# Patient Record
Sex: FEMALE | Race: WHITE | NOT HISPANIC OR LATINO | Employment: FULL TIME | URBAN - METROPOLITAN AREA
[De-identification: names, ages, dates, MRNs, and addresses within clinical notes are randomized per-mention and may not be internally consistent; named-entity substitution may affect disease eponyms.]

---
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
## 2021-10-26 ENCOUNTER — TRANSFERRED RECORDS (OUTPATIENT)
Dept: HEALTH INFORMATION MANAGEMENT | Facility: CLINIC | Age: 21
End: 2021-10-26

## 2021-11-03 ENCOUNTER — TRANSFERRED RECORDS (OUTPATIENT)
Dept: HEALTH INFORMATION MANAGEMENT | Facility: CLINIC | Age: 21
End: 2021-11-03

## 2022-11-17 ENCOUNTER — TRANSFERRED RECORDS (OUTPATIENT)
Dept: HEALTH INFORMATION MANAGEMENT | Facility: CLINIC | Age: 22
End: 2022-11-17

## 2023-01-11 ENCOUNTER — TRANSFERRED RECORDS (OUTPATIENT)
Dept: HEALTH INFORMATION MANAGEMENT | Facility: CLINIC | Age: 23
End: 2023-01-11

## 2023-05-17 ENCOUNTER — TRANSFERRED RECORDS (OUTPATIENT)
Dept: HEALTH INFORMATION MANAGEMENT | Facility: CLINIC | Age: 23
End: 2023-05-17

## 2023-11-20 NOTE — PROGRESS NOTES
"  Assessment & Plan     Bria was seen today for establish care and referral.    Diagnoses and all orders for this visit:    Encounter to establish care    Angular cheilitis  -     Adult Dermatology  Referral; Future    Prognathism  -     Oral Surgery Referral; Future    TERESITA (generalized anxiety disorder)  -     Adult Mental Health  Referral; Future  -     Adult Mental Health  Referral; Future    Congenital anteversion of both femurs         Venus Aleman DO PGY2  [facility] precepted with attending physician, Dr. Brewster.       Priti Fraser is a 23 year old, presenting for the following health issues:  Establish Care and Referral (Dermatology and psych)        11/21/2023     7:57 AM   Additional Questions   Roomed by camejo   Accompanied by self       HPI     23 year old female who presents today to establish care. Patient relocated to [place] from [place] 5 months ago. She was previously a patient of [facility].     Derm- she reports dry skin at the corners of her mouth and nose, has always been there but worse since moving to [place] and worse in winter. She was seen by dermatology in [place] and prescribed Hydrocortisone 0.025% and OTC \"jock itch cream,\" she uses these intermittently and applies them with a layer of vaseline. She has found this treatment to be somewhat helpful.     Psych- she was diagnosed with TERESITA as a young child, she reports her symptoms are well controlled today. Anxiety symptoms are more predominant than depression. She reports she was followed by psychiatry in [place] and prescribed 15 mg of lexapro daily, with 20 mg daily on the week of her menstruation. PHQ-9 score =8, TERESITA-7 score =11 (see objective for details). She states her psychiatrist diagnosed her clinically with ADHD, no formal neuropsych testing. She is prescribed 25 mg strattera that she does not take daily that she has been rationing.    Congenital bilateral femoral " "anteversion- patient was born with this condition and had braces as a child, no longer an issue, not followed by any specialists previously. She reports she was also previously seen by rheumatology for RA work up and was diagnosed with hypermobility syndrome. She does occasionally have hand pain that she believes is related to this condition.      Prognathism- she had braces as a child and her jaw was symmetrical, however- as an adult her jaw grew longer on one side and she was diagnosed with prognathism. She was evaluated by oral maxillary surgery team in [place] and was told she was a good surgical candidate. She endorses pain when chewing certain foods, talking for extended periods of time, or chewing gum. She would like to have the surgery eventually.           Objective    /74 (BP Location: Left arm, Patient Position: Sitting, Cuff Size: Adult Regular)   Pulse 71   Temp 97.9  F (36.6  C) (Tympanic)   Ht 1.717 m (5' 7.6\")   Wt 73.8 kg (162 lb 9.6 oz)   LMP 11/13/2023 (Exact Date)   SpO2 98%   BMI 25.02 kg/m    Body mass index is 25.02 kg/m .  Physical Exam   GENERAL: healthy, alert and no distress  EYES: Eyes grossly normal to inspection, conjunctivae and sclerae normal  NECK: no adenopathy, no asymmetry, masses, or scars   RESP: lungs clear to auscultation - no rales, rhonchi or wheezes  CV: regular rate and rhythm, normal S1 S2, no murmur, click or rub  MS: no gross musculoskeletal defects noted, no edema  SKIN: dry. Cracked skin at corners of her mouth and nose. No bleeding or open lesions.   NEURO: Normal strength and tone, mentation intact and speech normal        PATIENT HEALTH QUESTIONNAIRE-9 (PHQ - 9)    Over the last 2 weeks, how often have you been bothered by any of the following problems?    1. Little interest or pleasure in doing things -  Several days   2. Feeling down, depressed, or hopeless -  Several days   3. Trouble falling or staying asleep, or sleeping too much - Several days   4. " Feeling tired or having little energy -  Several days   5. Poor appetite or overeating -  Several days   6. Feeling bad about yourself - or that you are a failure or have let yourself or your family down -  Several days   7. Trouble concentrating on things, such as reading the newspaper or watching television - More than half the days   8. Moving or speaking so slowly that other people could have noticed? Or the opposite - being so fidgety or restless that you have been moving around a lot more than usual Not at all   9. Thoughts that you would be better off dead or of hurting  yourself in some way Not at all   Total Score: 8     If you checked off any problems, how difficult have these problems made it for you to do your work, take care of things at home, or get along with other people?      Developed by Tevin Peraza, Una Pelayo, Marck Etienne and colleagues, with an educational paco from [employer] No permission required to reproduce, translate, display or distribute. permission required to reproduce, translate, display or distribute.           11/21/2023     8:32 AM   TERESITA-7 SCORE   Total Score 11       ----- Service Performed and Documented by Resident or Fellow ------

## 2023-11-21 ENCOUNTER — OFFICE VISIT (OUTPATIENT)
Dept: FAMILY MEDICINE | Facility: CLINIC | Age: 23
End: 2023-11-21
Payer: COMMERCIAL

## 2023-11-21 VITALS
WEIGHT: 162.6 LBS | OXYGEN SATURATION: 98 % | SYSTOLIC BLOOD PRESSURE: 113 MMHG | TEMPERATURE: 97.9 F | HEIGHT: 68 IN | BODY MASS INDEX: 24.64 KG/M2 | DIASTOLIC BLOOD PRESSURE: 74 MMHG | HEART RATE: 71 BPM

## 2023-11-21 DIAGNOSIS — M26.19 PROGNATHISM: ICD-10-CM

## 2023-11-21 DIAGNOSIS — K13.0 ANGULAR CHEILITIS: ICD-10-CM

## 2023-11-21 DIAGNOSIS — Q65.89 CONGENITAL ANTEVERSION OF BOTH FEMURS: ICD-10-CM

## 2023-11-21 DIAGNOSIS — Z76.89 ENCOUNTER TO ESTABLISH CARE: Primary | ICD-10-CM

## 2023-11-21 DIAGNOSIS — F41.1 GAD (GENERALIZED ANXIETY DISORDER): ICD-10-CM

## 2023-11-21 PROCEDURE — 99203 OFFICE O/P NEW LOW 30 MIN: CPT | Mod: GC

## 2023-11-21 RX ORDER — ESCITALOPRAM OXALATE 10 MG/1
15 TABLET ORAL DAILY
COMMUNITY
Start: 2020-01-15

## 2023-11-21 RX ORDER — ATOMOXETINE 25 MG/1
CAPSULE ORAL
COMMUNITY
Start: 2023-05-29 | End: 2024-02-05 | Stop reason: ALTCHOICE

## 2023-11-21 ASSESSMENT — ANXIETY QUESTIONNAIRES
GAD7 TOTAL SCORE: 11
6. BECOMING EASILY ANNOYED OR IRRITABLE: MORE THAN HALF THE DAYS
3. WORRYING TOO MUCH ABOUT DIFFERENT THINGS: MORE THAN HALF THE DAYS
1. FEELING NERVOUS, ANXIOUS, OR ON EDGE: MORE THAN HALF THE DAYS
GAD7 TOTAL SCORE: 11
7. FEELING AFRAID AS IF SOMETHING AWFUL MIGHT HAPPEN: SEVERAL DAYS
IF YOU CHECKED OFF ANY PROBLEMS ON THIS QUESTIONNAIRE, HOW DIFFICULT HAVE THESE PROBLEMS MADE IT FOR YOU TO DO YOUR WORK, TAKE CARE OF THINGS AT HOME, OR GET ALONG WITH OTHER PEOPLE: SOMEWHAT DIFFICULT
2. NOT BEING ABLE TO STOP OR CONTROL WORRYING: MORE THAN HALF THE DAYS
5. BEING SO RESTLESS THAT IT IS HARD TO SIT STILL: SEVERAL DAYS

## 2023-11-21 ASSESSMENT — PATIENT HEALTH QUESTIONNAIRE - PHQ9
5. POOR APPETITE OR OVEREATING: SEVERAL DAYS
SUM OF ALL RESPONSES TO PHQ QUESTIONS 1-9: 8

## 2023-11-21 NOTE — ASSESSMENT & PLAN NOTE
TERESITA-7 score = 11 on 11/21. Current treatment: 15 mg lexapro daily and 20 mg daily the week of her menstruation. Referral for counseling placed 11/21. Community Resources provided in S.

## 2023-11-21 NOTE — PATIENT INSTRUCTIONS
Your doctor has put in a mental health referral for psychotherapy and/or psychiatry treatment. Our behavioral health team will evaluate your referral to see if they have any more recommendations or if they have openings for therapy in-house at [facility]. Our referral team would then contact you within 1-2 weeks with this update. However, if you want to get started for psychotherapy or schedule with a psychiatrist sooner, we recommend you call one or two of the following resources.  Let your doctor know if you do connect with a therapist or psychiatrist.     [facility] Mental [facility] and Addiction Central Scheduling  [phone].     Community Mental Health Agencies:    [facility] Office   [address]  [place] [address]  [phone] (for appointments)    [facility]  [address] [address]  Phone:  [phone]    [address]  [place] [address]  [phone]    [facility] [employer]  [address] [place] [address]  [phone]    [facility]  [address] [facility] [address]  [address]  [place], [place] [address]  [phone]    [facility]  [address]  [place] [address]  [phone]    Community Psychiatry Agencies:    [facility]  [address] [facility] [address] [facility] [address]  [address]  [place], [place] [address]  [phone]    [facility] Office  [address]  [place] [address]  [phone] (for appointments)    [facility]  [address]  [place] [address]  Phone:  [phone]    [address]  [address]  [place], [place] [address] [phone]    [facility] [employer]  [address] [facility]  [address]  [place] [address]  [phone]    [facility]  [address]  [place] [address]  [phone]    [facility] - offers psychiatry and psychology services across the lifespan  [facility]  [address] [place] [address] [address]  [place], [place]  [phone]    Children s Mental Health Agencies:    [facility] [address] [place] [place] [address]  [phone]    Walk-in evaluations are available in-person (walk into clinic) or virtually (call us) Monday through Thursday from 9 a.m. - 3 p.m. and Friday from 9 a.m. - 2 p.m. with no appointment needed for adults and youth (ages 6-17) with a parent or guardian.    Adrian Romero  [address] [place] [address]  [phone]    [facility] Office  [address] [place] [id number] [phone]    [facility] Office  [facility] [place] [address]  Phone: [phone]    Associated [address] [address]  [phone] (for appointments)    [facility] [address] [facility]  [address]  [place] [address]  Phone:  [phone]      Teen Mental Health Agencies:    Face To Face  [address] [place] [address]  Call [phone] to schedule an appointment.    [address] [address]  [phone]    Walk-in evaluations are available in-person (walk into clinic) or virtually (call us) Monday through Thursday from 9 a.m. - 3 p.m. and Friday from 9 a.m. - 2 p.m. with no appointment needed for adults and youth (ages 6-17) with a parent or guardian.    Adrian Romero  [address] [place] [address]  [phone]    [facility] [address] [address]  [phone] (for appointments)  Fax: [phone]    [facility]  [address] [facility] [facility]  [address]  [place] [address]  Phone:  [phone]

## 2023-11-21 NOTE — PROGRESS NOTES
Preceptor Attestation:    I discussed the patient with the resident and evaluated the patient in person. I have verified the content of the note, which accurately reflects my assessment of the patient and the plan of care.   Supervising Physician:  Chapincito Brewster MD.

## 2023-12-31 ENCOUNTER — HEALTH MAINTENANCE LETTER (OUTPATIENT)
Age: 23
End: 2023-12-31

## 2024-01-29 ASSESSMENT — ENCOUNTER SYMPTOMS
PARESTHESIAS: 0
ABDOMINAL PAIN: 0
HEMATURIA: 0
COUGH: 0
WEAKNESS: 0
DIARRHEA: 0
MYALGIAS: 0
SHORTNESS OF BREATH: 0
NERVOUS/ANXIOUS: 0
DIZZINESS: 0
NAUSEA: 0
FEVER: 0
CONSTIPATION: 0
PALPITATIONS: 0
ARTHRALGIAS: 0
DYSURIA: 0
HEARTBURN: 0
HEADACHES: 0
HEMATOCHEZIA: 0
JOINT SWELLING: 0
CHILLS: 0
FREQUENCY: 0
SORE THROAT: 0
BREAST MASS: 0
EYE PAIN: 0

## 2024-02-05 ENCOUNTER — OFFICE VISIT (OUTPATIENT)
Dept: FAMILY MEDICINE | Facility: CLINIC | Age: 24
End: 2024-02-05
Payer: COMMERCIAL

## 2024-02-05 VITALS
OXYGEN SATURATION: 96 % | BODY MASS INDEX: 24.89 KG/M2 | TEMPERATURE: 98.1 F | HEIGHT: 67 IN | RESPIRATION RATE: 20 BRPM | DIASTOLIC BLOOD PRESSURE: 66 MMHG | HEART RATE: 89 BPM | SYSTOLIC BLOOD PRESSURE: 107 MMHG | WEIGHT: 158.6 LBS

## 2024-02-05 DIAGNOSIS — Z11.4 SCREENING FOR HIV (HUMAN IMMUNODEFICIENCY VIRUS): ICD-10-CM

## 2024-02-05 DIAGNOSIS — Z11.59 NEED FOR HEPATITIS C SCREENING TEST: ICD-10-CM

## 2024-02-05 DIAGNOSIS — Z00.00 PREVENTATIVE HEALTH CARE: Primary | ICD-10-CM

## 2024-02-05 LAB
ERYTHROCYTE [DISTWIDTH] IN BLOOD BY AUTOMATED COUNT: 12.2 % (ref 10–15)
HCT VFR BLD AUTO: 41 % (ref 35–47)
HGB BLD-MCNC: 13.8 G/DL (ref 11.7–15.7)
MCH RBC QN AUTO: 27.8 PG (ref 26.5–33)
MCHC RBC AUTO-ENTMCNC: 33.7 G/DL (ref 31.5–36.5)
MCV RBC AUTO: 83 FL (ref 78–100)
PLATELET # BLD AUTO: 371 10E3/UL (ref 150–450)
RBC # BLD AUTO: 4.96 10E6/UL (ref 3.8–5.2)
WBC # BLD AUTO: 8.7 10E3/UL (ref 4–11)

## 2024-02-05 PROCEDURE — 87389 HIV-1 AG W/HIV-1&-2 AB AG IA: CPT

## 2024-02-05 PROCEDURE — 36415 COLL VENOUS BLD VENIPUNCTURE: CPT

## 2024-02-05 PROCEDURE — 85027 COMPLETE CBC AUTOMATED: CPT

## 2024-02-05 PROCEDURE — 82306 VITAMIN D 25 HYDROXY: CPT

## 2024-02-05 PROCEDURE — 84443 ASSAY THYROID STIM HORMONE: CPT

## 2024-02-05 PROCEDURE — 82533 TOTAL CORTISOL: CPT

## 2024-02-05 PROCEDURE — 91320 SARSCV2 VAC 30MCG TRS-SUC IM: CPT

## 2024-02-05 PROCEDURE — 99395 PREV VISIT EST AGE 18-39: CPT | Mod: 25

## 2024-02-05 PROCEDURE — 86803 HEPATITIS C AB TEST: CPT

## 2024-02-05 PROCEDURE — 90480 ADMN SARSCOV2 VAC 1/ONLY CMP: CPT

## 2024-02-05 RX ORDER — NAPROXEN 500 MG/1
500 TABLET ORAL 2 TIMES DAILY WITH MEALS
Qty: 60 TABLET | Refills: 0 | Status: SHIPPED | OUTPATIENT
Start: 2024-02-05 | End: 2024-03-06

## 2024-02-05 RX ORDER — DEXTROAMPHETAMINE SACCHARATE, AMPHETAMINE ASPARTATE MONOHYDRATE, DEXTROAMPHETAMINE SULFATE AND AMPHETAMINE SULFATE 2.5; 2.5; 2.5; 2.5 MG/1; MG/1; MG/1; MG/1
10 CAPSULE, EXTENDED RELEASE ORAL DAILY
COMMUNITY

## 2024-02-05 NOTE — PROGRESS NOTES
Preventive Care Visit  [facility]  Venus Aleman DO, Student in organized health care education/training program  Feb 5, 2024    Assessment & Plan     Preventative health care  23 year old female with history of TERESITA, ADHD, angular cheilitis, prognathism, and hypermobility syndrome in her usual state of health. She is due for cervical cancer screening but prefers to have this done by OB who she will see next month. Not currently due for any further preventative screening. Reviewed medical, family, and social history today. Will collect labs for vitamin D deficiency, CBC, TSH, and cortisol. Follows with psych for ADHD and TERESITA, establishing with derm and OB soon.   - amphetamine-dextroamphetamine (ADDERALL XR) 10 MG 24 hr capsule  - COVID-19 12+ (2023-24) (PFIZER)  - naproxen (NAPROSYN) 500 MG tablet  Dispense: 60 tablet; Refill: 0  - Vitamin D deficiency screening  - CBC with Platelets and Reflex to Iron Studies  - TSH with free T4 reflex  - Cortisol    Screening for HIV (human immunodeficiency virus)  Need for hepatitis C screening test  Patient agreeable to one time recommended screening.   - Hepatitis C Screen Reflex to HCV RNA Quant and Genotype  - HIV Screening        Counseling  Appropriate preventive services were discussed with this patient, including applicable screening as appropriate for fall prevention, nutrition, physical activity, Tobacco-use cessation, weight loss and cognition.  Checklist reviewing preventive services available has been given to the patient.  Reviewed patient's diet, addressing concerns and/or questions.     Patient has been advised of split billing requirements and indicates understanding: Yes      Return in about 1 year (around 2/5/2025) for Preventive Visit.    Priti Fraser is a 23 year old, presenting for the following:  Physical and LAB REQUEST        2/5/2024     4:07 PM   Additional Questions   Roomed by    Accompanied by Self           Healthy Habits:     Getting at least 3 servings of Calcium per day:  Yes    Bi-annual eye exam:  NO    Dental care twice a year:  Yes    Sleep apnea or symptoms of sleep apnea:  None    Diet:  Regular (no restrictions) and Breakfast skipped    Frequency of exercise:  2-3 days/week    Duration of exercise:  15-30 minutes    Taking medications regularly:  Yes    Medication side effects:  None    Additional concerns today:  Yes      Patient is in her usual state of health today and feeling well. She follows with psych for MDD and ADHD, she is waiting to establish with derm with a scheduled appt in 07/2024 for her angular cheilitis, and she has yet to hear back from oral surgery who she reached out to regarding her referral.     She is due for a pap but plans to have this done with ob/gyn in a few weeks. She is still sexually active with one male partner in a monogamous relationship with no  complaints. She recently discontinued her OCPs due to weight gain and is using barrier method of protection. She reports her menstruation is regular in frequency and blood loss, her LMP was 1/19/24. She endorses menstrual cramps with her cycles.     Her legs have on several occasions become cold and itchy while walking outside, though not in particularly cold weather with some of the occasions being in [place]. These episodes self resolve.         1/29/2024   Social Factors   Worry food won't last until get money to buy more No   Food not last or not have enough money for food? No   Do you have housing?  Yes   Are you worried about losing your housing? No   Lack of transportation? No   Unable to get utilities (heat,electricity)? No         Today's PHQ-2 Score:       11/21/2023     8:32 AM   PHQ-2 ( 1999 [employer])   Q1: Little interest or pleasure in doing things 1   Q2: Feeling down, depressed or hopeless 1   PHQ-2 Score 2         1/29/2024   Substance Use   Alcohol more than 3/day or more than 7/wk No     Social History  "    Tobacco Use    Smoking status: Never    Smokeless tobacco: Never         History of abnormal Pap smear: NO - age 21-29 PAP every 3 years recommended                  Reviewed and updated as needed this visit by Provider                    No past medical history on file.  No past surgical history on file.  Lab work is in process       Objective    Exam  /66 (BP Location: Right arm, Patient Position: Sitting, Cuff Size: Adult Regular)   Pulse 89   Temp 98.1  F (36.7  C) (Oral)   Resp 20   Ht 1.709 m (5' 7.3\")   Wt 71.9 kg (158 lb 9.6 oz)   LMP 01/19/2024 (Exact Date)   SpO2 96%   BMI 24.62 kg/m     Estimated body mass index is 24.62 kg/m  as calculated from the following:    Height as of this encounter: 1.709 m (5' 7.3\").    Weight as of this encounter: 71.9 kg (158 lb 9.6 oz).    Physical Exam  GENERAL: alert and no distress  EYES: Eyes grossly normal to inspection, conjunctivae and sclerae normal  NECK: no adenopathy, no asymmetry, masses, or scars  RESP: lungs clear to auscultation - no rales, rhonchi or wheezes  CV: regular rate and rhythm, normal S1 S2, no S3 or S4, no murmur, click or rub, no peripheral edema  ABDOMEN: soft, nontender, no hepatosplenomegaly, no masses and bowel sounds normal  MS: no gross musculoskeletal defects noted, no edema  PSYCH: mentation appears normal, affect normal/bright      Signed Electronically by: Venus Aleman DO    Answers submitted by the patient for this visit:  Annual Preventive Visit (Submitted on 1/29/2024)  Chief Complaint: Annual Exam:  Blood in stool: No  heartburn: No  peripheral edema: No  mood changes: No  Skin sensation changes: No  tenderness: No  breast mass: No  breast discharge: No    "

## 2024-02-05 NOTE — PATIENT INSTRUCTIONS
"Please be aware that coverage of these services is subject to the terms and limitations of your health insurance plan.  Call member services at your health plan with any benefit or coverage questions.  [facility] - [place] - [phone]  ----------------------------------------------------------    Eating Healthy Foods: Care Instructions  With every meal, you can make healthy food choices. Try to eat a variety of fruits, vegetables, whole grains, lean proteins, and low-fat dairy products. This can help you get the right balance of nutrients, including vitamins and minerals. Small changes add up over time. You can start by adding one healthy food to your meals each day.    Try to make half your plate fruits and vegetables, one-fourth whole grains, and one-fourth lean proteins. Try including dairy with your meals.   Eat more fruits and vegetables. Try to have them with most meals and snacks.   Foods for healthy eating    Fruits    These can be fresh, frozen, canned, or dried.  Try to choose whole fruit rather than fruit juice.  Eat a variety of colors.    Vegetables    These can be fresh, frozen, canned, or dried.  Beans, peas, and lentils count too.    Whole grains    Choose whole-grain breads, cereals, and noodles.  Try brown rice.    Lean proteins    These can include lean meat, poultry, fish, and eggs.  You can also have tofu, beans, peas, lentils, nuts, and seeds.    Dairy    Try milk, yogurt, and cheese.  Choose low-fat or fat-free when you can.  If you need to, use lactose-free milk or fortified plant-based milk products, such as soy milk.    Water    Drink water when you're thirsty.  Limit sugar-sweetened drinks, including soda, fruit drinks, and sports drinks.  Where can you learn more?  Go to https://www.healthwise.net/patiented  Enter T756 in the search box to learn more about \"Eating Healthy Foods: Care Instructions.\"  Current as of: February 28, 2023               Content " Version: 13.8    4457-9177 [employer].   Care instructions adapted under license by your healthcare professional. If you have questions about a medical condition or this instruction, always ask your healthcare professional. [employer] disclaims any warranty or liability for your use of this information.      Preventive Care Advice   This is general advice given by our system to help you stay healthy. However, your care team may have specific advice just for you. Please talk to your care team about your preventive care needs.  Nutrition  Eat 5 or more servings of fruits and vegetables each day.  Try wheat bread, brown rice and whole grain pasta (instead of white bread, rice, and pasta).  Get enough calcium and vitamin D. Check the label on foods and aim for 100% of the RDA (recommended daily allowance).  Lifestyle  Exercise at least 150 minutes each week  (30 minutes a day, 5 days a week).  Do muscle strengthening activities 2 days a week. These help control your weight and prevent disease.  No smoking.  Wear sunscreen to prevent skin cancer.  Have a dental exam and cleaning every 6 months.  Yearly exams  See your health care team every year to talk about:  Any changes in your health.  Any medicines your care team has prescribed.  Preventive care, family planning, and ways to prevent chronic diseases.  Shots (vaccines)   HPV shots (up to age 26), if you've never had them before.  Hepatitis B shots (up to age 59), if you've never had them before.  COVID-19 shot: Get this shot when it's due.  Flu shot: Get a flu shot every year.  Tetanus shot: Get a tetanus shot every 10 years.  Pneumococcal, hepatitis A, and RSV shots: Ask your care team if you need these based on your risk.  Shingles shot (for age 50 and up)  General health tests  Diabetes screening:  Starting at age 35, Get screened for diabetes at least every 3 years.  If you are younger than age 35, ask your care team if you should be  screened for diabetes.  Cholesterol test: At age 39, start having a cholesterol test every 5 years, or more often if advised.  Bone density scan (DEXA): At age 50, ask your care team if you should have this scan for osteoporosis (brittle bones).  Hepatitis C: Get tested at least once in your life.  STIs (sexually transmitted infections)  Before age 24: Ask your care team if you should be screened for STIs.  After age 24: Get screened for STIs if you're at risk. You are at risk for STIs (including HIV) if:  You are sexually active with more than one person.  You don't use condoms every time.  You or a partner was diagnosed with a sexually transmitted infection.  If you are at risk for HIV, ask about PrEP medicine to prevent HIV.  Get tested for HIV at least once in your life, whether you are at risk for HIV or not.  Cancer screening tests  Cervical cancer screening: If you have a cervix, begin getting regular cervical cancer screening tests starting at age 21.  Breast cancer scan (mammogram): If you've ever had breasts, begin having regular mammograms starting at age 40. This is a scan to check for breast cancer.  Colon cancer screening: It is important to start screening for colon cancer at age 45.  Have a colonoscopy test every 10 years (or more often if you're at risk) Or, ask your provider about stool tests like a FIT test every year or Cologuard test every 3 years.  To learn more about your testing options, visit:   https://www.[employer]/665840.pdf.  For help making a decision, visit:   https://bit.ly/rv22337.  Prostate cancer screening test: If you have a prostate, ask your care team if a prostate cancer screening test (PSA) at age 55 is right for you.  Lung cancer screening: If you are a current or former smoker ages 50 to 80, ask your care team if ongoing lung cancer screenings are right for you.  For informational purposes only. Not to replace the advice of your health care provider. Copyright   2023  "[facility]. All rights reserved. Clinically reviewed by the [facility] Transitions Program. [employer] 979959 - REV 01/24.    Eating Healthy Foods: Care Instructions  With every meal, you can make healthy food choices. Try to eat a variety of fruits, vegetables, whole grains, lean proteins, and low-fat dairy products. This can help you get the right balance of nutrients, including vitamins and minerals. Small changes add up over time. You can start by adding one healthy food to your meals each day.    Try to make half your plate fruits and vegetables, one-fourth whole grains, and one-fourth lean proteins. Try including dairy with your meals.   Eat more fruits and vegetables. Try to have them with most meals and snacks.   Foods for healthy eating    Fruits    These can be fresh, frozen, canned, or dried.  Try to choose whole fruit rather than fruit juice.  Eat a variety of colors.    Vegetables    These can be fresh, frozen, canned, or dried.  Beans, peas, and lentils count too.    Whole grains    Choose whole-grain breads, cereals, and noodles.  Try brown rice.    Lean proteins    These can include lean meat, poultry, fish, and eggs.  You can also have tofu, beans, peas, lentils, nuts, and seeds.    Dairy    Try milk, yogurt, and cheese.  Choose low-fat or fat-free when you can.  If you need to, use lactose-free milk or fortified plant-based milk products, such as soy milk.    Water    Drink water when you're thirsty.  Limit sugar-sweetened drinks, including soda, fruit drinks, and sports drinks.  Where can you learn more?  Go to https://www.[employer].net/patiented  Enter T756 in the search box to learn more about \"Eating Healthy Foods: Care Instructions.\"  Current as of: February 28, 2023               Content Version: 13.8    7876-3332 [employer].   Care instructions adapted under license by your healthcare professional. If you have questions about a medical condition or this " instruction, always ask your healthcare professional. Health[employer] disclaims any warranty or liability for your use of this information.      Preventive Care Advice   This is general advice given by our system to help you stay healthy. However, your care team may have specific advice just for you. Please talk to your care team about your preventive care needs.  Nutrition  Eat 5 or more servings of fruits and vegetables each day.  Try wheat bread, brown rice and whole grain pasta (instead of white bread, rice, and pasta).  Get enough calcium and vitamin D. Check the label on foods and aim for 100% of the RDA (recommended daily allowance).  Lifestyle  Exercise at least 150 minutes each week  (30 minutes a day, 5 days a week).  Do muscle strengthening activities 2 days a week. These help control your weight and prevent disease.  No smoking.  Wear sunscreen to prevent skin cancer.  Have a dental exam and cleaning every 6 months.  Yearly exams  See your health care team every year to talk about:  Any changes in your health.  Any medicines your care team has prescribed.  Preventive care, family planning, and ways to prevent chronic diseases.  Shots (vaccines)   HPV shots (up to age 26), if you've never had them before.  Hepatitis B shots (up to age 59), if you've never had them before.  COVID-19 shot: Get this shot when it's due.  Flu shot: Get a flu shot every year.  Tetanus shot: Get a tetanus shot every 10 years.  Pneumococcal, hepatitis A, and RSV shots: Ask your care team if you need these based on your risk.  Shingles shot (for age 50 and up)  General health tests  Diabetes screening:  Starting at age 35, Get screened for diabetes at least every 3 years.  If you are younger than age 35, ask your care team if you should be screened for diabetes.  Cholesterol test: At age 39, start having a cholesterol test every 5 years, or more often if advised.  Bone density scan (DEXA): At age 50, ask your care team if  you should have this scan for osteoporosis (brittle bones).  Hepatitis C: Get tested at least once in your life.  STIs (sexually transmitted infections)  Before age 24: Ask your care team if you should be screened for STIs.  After age 24: Get screened for STIs if you're at risk. You are at risk for STIs (including HIV) if:  You are sexually active with more than one person.  You don't use condoms every time.  You or a partner was diagnosed with a sexually transmitted infection.  If you are at risk for HIV, ask about PrEP medicine to prevent HIV.  Get tested for HIV at least once in your life, whether you are at risk for HIV or not.  Cancer screening tests  Cervical cancer screening: If you have a cervix, begin getting regular cervical cancer screening tests starting at age 21.  Breast cancer scan (mammogram): If you've ever had breasts, begin having regular mammograms starting at age 40. This is a scan to check for breast cancer.  Colon cancer screening: It is important to start screening for colon cancer at age 45.  Have a colonoscopy test every 10 years (or more often if you're at risk) Or, ask your provider about stool tests like a FIT test every year or Cologuard test every 3 years.  To learn more about your testing options, visit:   [email or web address].  For help making a decision, visit:   https://bit.ly/el34042.  Prostate cancer screening test: If you have a prostate, ask your care team if a prostate cancer screening test (PSA) at age 55 is right for you.  Lung cancer screening: If you are a current or former smoker ages 50 to 80, ask your care team if ongoing lung cancer screenings are right for you.  For informational purposes only. Not to replace the advice of your health care provider. Copyright   2023 [employer]. All rights reserved. Clinically reviewed by the [facility] Transitions Program. "[employer]" [id number] - REV 01/24.

## 2024-02-06 LAB
CORTIS SERPL-MCNC: 4.2 UG/DL
HCV AB SERPL QL IA: NONREACTIVE
HIV 1+2 AB+HIV1 P24 AG SERPL QL IA: NONREACTIVE
HOLD SPECIMEN: NORMAL
TSH SERPL DL<=0.005 MIU/L-ACNC: 2.13 UIU/ML (ref 0.3–4.2)
VIT D+METAB SERPL-MCNC: 13 NG/ML (ref 20–50)

## 2024-02-07 NOTE — PROGRESS NOTES
Physician Attestation   I, Herman Ferrari MD, saw this patient and agree with the findings and plan of care as documented in the note.      Items personally reviewed/procedural attestation: vitals.    Herman Ferrari MD

## 2024-02-26 ENCOUNTER — LAB (OUTPATIENT)
Dept: LAB | Facility: CLINIC | Age: 24
End: 2024-02-26
Payer: COMMERCIAL

## 2024-02-26 DIAGNOSIS — F90.2 ADHD (ATTENTION DEFICIT HYPERACTIVITY DISORDER), COMBINED TYPE: Primary | ICD-10-CM

## 2024-02-26 LAB
ALBUMIN SERPL BCG-MCNC: 4.2 G/DL (ref 3.5–5.2)
ALP SERPL-CCNC: 77 U/L (ref 40–150)
ALT SERPL W P-5'-P-CCNC: 7 U/L (ref 0–50)
ANION GAP SERPL CALCULATED.3IONS-SCNC: 10 MMOL/L (ref 7–15)
AST SERPL W P-5'-P-CCNC: 19 U/L (ref 0–45)
BILIRUB SERPL-MCNC: 0.2 MG/DL
BUN SERPL-MCNC: 10.8 MG/DL (ref 6–20)
CALCIUM SERPL-MCNC: 9.1 MG/DL (ref 8.6–10)
CHLORIDE SERPL-SCNC: 106 MMOL/L (ref 98–107)
CREAT SERPL-MCNC: 0.87 MG/DL (ref 0.51–0.95)
DEPRECATED HCO3 PLAS-SCNC: 25 MMOL/L (ref 22–29)
EGFRCR SERPLBLD CKD-EPI 2021: >90 ML/MIN/1.73M2
GLUCOSE SERPL-MCNC: 92 MG/DL (ref 70–99)
POTASSIUM SERPL-SCNC: 3.7 MMOL/L (ref 3.4–5.3)
PROT SERPL-MCNC: 7.3 G/DL (ref 6.4–8.3)
SODIUM SERPL-SCNC: 141 MMOL/L (ref 135–145)
TSH SERPL DL<=0.005 MIU/L-ACNC: 2.92 UIU/ML (ref 0.3–4.2)
VIT B12 SERPL-MCNC: 284 PG/ML (ref 232–1245)
VIT D+METAB SERPL-MCNC: 19 NG/ML (ref 20–50)

## 2024-02-26 PROCEDURE — 84443 ASSAY THYROID STIM HORMONE: CPT

## 2024-02-26 PROCEDURE — 80053 COMPREHEN METABOLIC PANEL: CPT

## 2024-02-26 PROCEDURE — 82306 VITAMIN D 25 HYDROXY: CPT

## 2024-02-26 PROCEDURE — 82607 VITAMIN B-12: CPT

## 2024-02-26 PROCEDURE — 36415 COLL VENOUS BLD VENIPUNCTURE: CPT

## 2024-03-07 ENCOUNTER — OFFICE VISIT (OUTPATIENT)
Dept: MIDWIFE SERVICES | Facility: CLINIC | Age: 24
End: 2024-03-07
Payer: COMMERCIAL

## 2024-03-07 VITALS
WEIGHT: 154 LBS | SYSTOLIC BLOOD PRESSURE: 98 MMHG | HEIGHT: 68 IN | DIASTOLIC BLOOD PRESSURE: 62 MMHG | BODY MASS INDEX: 23.34 KG/M2

## 2024-03-07 DIAGNOSIS — R10.2 PELVIC PAIN IN FEMALE: ICD-10-CM

## 2024-03-07 DIAGNOSIS — Z01.419 ENCOUNTER FOR CERVICAL PAP SMEAR WITH PELVIC EXAM: Primary | ICD-10-CM

## 2024-03-07 PROCEDURE — 99385 PREV VISIT NEW AGE 18-39: CPT | Mod: 25 | Performed by: ADVANCED PRACTICE MIDWIFE

## 2024-03-07 PROCEDURE — 90715 TDAP VACCINE 7 YRS/> IM: CPT | Performed by: ADVANCED PRACTICE MIDWIFE

## 2024-03-07 PROCEDURE — G0145 SCR C/V CYTO,THINLAYER,RESCR: HCPCS | Performed by: ADVANCED PRACTICE MIDWIFE

## 2024-03-07 PROCEDURE — 90471 IMMUNIZATION ADMIN: CPT | Performed by: ADVANCED PRACTICE MIDWIFE

## 2024-03-07 PROCEDURE — 99459 PELVIC EXAMINATION: CPT | Performed by: ADVANCED PRACTICE MIDWIFE

## 2024-03-07 NOTE — PROGRESS NOTES
"Bria is a 24 year old No obstetric history on file female who presents for annual exam.     Besides routine health maintenance, inability to use tampons due to pain. Reports she has no history of vaginal penetrative sex.   HPI:  Bria had a recent annual exam with her PCP. She is here just for a pap smear.   Menses are regular. Recently stopped DIONICIO's  Menses flow: normal.    Patient's last menstrual period was 02/21/2024 (exact date)..   Using none for contraception.    She is not currently considering pregnancy.    REPRODUCTIVE/GYNECOLOGIC HISTORY:  Bria is not sexually active with male partner(s) and is currently in monogamous relationship.   STI testing offered?  Declined    She  reports that she has never smoked. She has never used smokeless tobacco.      Last PHQ-9 score on record =       11/21/2023     8:32 AM   PHQ-9 SCORE   PHQ-9 Total Score 8     Last GAD7 score on record =       11/21/2023     8:32 AM   TERESITA-7 SCORE   Total Score 11         HEALTH MAINTENANCE:  TSH: (  TSH   Date Value Ref Range Status   02/26/2024 2.92 0.30 - 4.20 uIU/mL Final    )  Pap; (No results found for: \"PAP\" )  Immunizations up to date: no, due for Tdap and accepting of this today.   (Gardasil, Tdap, Flu)      HISTORY:  OB History   No obstetric history on file.     No past medical history on file.  No past surgical history on file.  No family history on file.  Social History     Socioeconomic History    Marital status: Single   Tobacco Use    Smoking status: Never    Smokeless tobacco: Never     Social Determinants of Health     Financial Resource Strain: Low Risk  (1/29/2024)    Financial Resource Strain     Within the past 12 months, have you or your family members you live with been unable to get utilities (heat, electricity) when it was really needed?: No   Food Insecurity: Low Risk  (1/29/2024)    Food Insecurity     Within the past 12 months, did you worry that your food would run out before you got money to buy " "more?: No     Within the past 12 months, did the food you bought just not last and you didn t have money to get more?: No   Transportation Needs: Low Risk  (1/29/2024)    Transportation Needs     Within the past 12 months, has lack of transportation kept you from medical appointments, getting your medicines, non-medical meetings or appointments, work, or from getting things that you need?: No   Interpersonal Safety: Low Risk  (11/21/2023)    Interpersonal Safety     Do you feel physically and emotionally safe where you currently live?: Yes     Within the past 12 months, have you been hit, slapped, kicked or otherwise physically hurt by someone?: No     Within the past 12 months, have you been humiliated or emotionally abused in other ways by your partner or ex-partner?: No   Housing Stability: Low Risk  (1/29/2024)    Housing Stability     Do you have housing? : Yes     Are you worried about losing your housing?: No       Current Outpatient Medications:     amphetamine-dextroamphetamine (ADDERALL XR) 10 MG 24 hr capsule, Take 10 mg by mouth daily, Disp: , Rfl:     escitalopram (LEXAPRO) 10 MG tablet, 15 mg daily 20 mg daily  when on menstrual cycle, Disp: , Rfl:    No Known Allergies    Past medical, surgical, social and family history were reviewed and updated in [facility].    ROS:   CONSTITUTIONAL: NEGATIVE for fever, chills, change in weight  : NEGATIVE for unusual urinary or vaginal symptoms. Periods are regular. Reports she is unable to use tampons due to placement being too uncomfortable.     PHYSICAL EXAM:  BP 98/62 (BP Location: Right arm, Patient Position: Sitting, Cuff Size: Adult Regular)   Ht 1.715 m (5' 7.5\")   Wt 69.9 kg (154 lb)   LMP 02/21/2024 (Exact Date)   BMI 23.76 kg/m     BMI: Body mass index is 23.76 kg/m .  Constitutional: healthy, alert and no distress  Breast:declined  Gastrointestinal: abdomen soft, non-tender, bowel sounds present  PELVIC EXAM:  Vulva: No lesions, no adenopathy, BUS " WNL  Vagina: Moist, pink, discharge normal  well rugated, no lesions  Cervix:unable to visualize  Uterus: non-tender  Ovaries:deferred  Rectal exam: deferred    ASSESSMENT/PLAN:    ICD-10-CM    1. Encounter for cervical Pap smear with pelvic exam  Z01.419       2. Pelvic pain in female  R10.2         No results found for any visits on 03/07/24.      COUNSELING:   Pelvic exam not tolerated by Bria. States she has too much pain with trying to use tampons and so does not use them. Unsure if sample was adequate for pap smear due to inability to open speculum. Agreeable to an OB/GYN consult for pelvic pain. Aware pap smear may not be able to be resulted due to limitation of sampling.     Risa Ackerman CNM

## 2024-03-07 NOTE — NURSING NOTE
Prior to immunization administration, verified patients identity using patient s name and date of birth. Please see Immunization Activity for additional information.     Screening Questionnaire for Adult Immunization    Are you sick today?   No   Do you have allergies to medications, food, a vaccine component or latex?   No   Have you ever had a serious reaction after receiving a vaccination?   No   Do you have a long-term health problem with heart, lung, kidney, or metabolic disease (e.g., diabetes), asthma, a blood disorder, no spleen, complement component deficiency, a cochlear implant, or a spinal fluid leak?  Are you on long-term aspirin therapy?   No   Do you have cancer, leukemia, HIV/AIDS, or any other immune system problem?   No   Do you have a parent, brother, or sister with an immune system problem?   No   In the past 3 months, have you taken medications that affect  your immune system, such as prednisone, other steroids, or anticancer drugs; drugs for the treatment of rheumatoid arthritis, Crohn s disease, or psoriasis; or have you had radiation treatments?   No   Have you had a seizure, or a brain or other nervous system problem?   No   During the past year, have you received a transfusion of blood or blood    products, or been given immune (gamma) globulin or antiviral drug?   No   For women: Are you pregnant or is there a chance you could become       pregnant during the next month?   No   Have you received any vaccinations in the past 4 weeks?   No     Immunization questionnaire answers were all negative.      Patient instructed to remain in clinic for 15 minutes afterwards, and to report any adverse reactions.     Screening performed by Ramo Goldberg CMA on 3/7/2024 at 5:05 PM.

## 2024-03-07 NOTE — PATIENT INSTRUCTIONS
Murali Fraser,     It was nice meeting you today. I have sent a referral over to [facility] [address]. [phone]. Please call them to set up an appointment.     Take care,   Risa Ackerman CNM

## 2024-03-12 LAB
BKR LAB AP GYN ADEQUACY: NORMAL
BKR LAB AP GYN INTERPRETATION: NORMAL
BKR LAB AP HPV REFLEX: NO
BKR LAB AP LMP: NORMAL
BKR LAB AP PREVIOUS ABNORMAL: NORMAL
PATH REPORT.COMMENTS IMP SPEC: NORMAL
PATH REPORT.COMMENTS IMP SPEC: NORMAL
PATH REPORT.RELEVANT HX SPEC: NORMAL

## 2024-03-21 ENCOUNTER — VIRTUAL VISIT (OUTPATIENT)
Dept: FAMILY MEDICINE | Facility: CLINIC | Age: 24
End: 2024-03-21
Payer: COMMERCIAL

## 2024-03-21 DIAGNOSIS — N92.6 IRREGULAR MENSES: Primary | ICD-10-CM

## 2024-03-21 DIAGNOSIS — R10.2 PELVIC PAIN IN FEMALE: ICD-10-CM

## 2024-03-21 PROCEDURE — 99213 OFFICE O/P EST LOW 20 MIN: CPT | Mod: 95

## 2024-03-21 NOTE — PROGRESS NOTES
I was present with the medical student who participated in the service and in the documentation of this note. I have verified the history and personally performed the physical exam and medical decision making, and have verified the content of the note, which accurately reflects my assessment of the patient and the plan of care.   Venus Aleman, DO PGY2      Bria is a 24 year old who is being evaluated via a billable video visit.    How would you like to obtain your AVS? MyChart  If the video visit is dropped, the invitation should be resent by: Text to cell phone: [phone]  Will anyone else be joining your video visit? No      Assessment & Plan     Pelvic pain in female  Irregular menses  23 y/o presenting virtually with menstrual period that is 2 weeks ahead of schedule.  Cycles have historically been regular, but became irregular after stopping birth control in May of 2023.  She states there is no chance of pregnancy.  Top differential is breakthrough bleeding 2/2 her contraceptive use last year.  Visit was mostly education and reassurance, but recommended pelvic floor PT for cramping issues every other month and patient was amenable.  - Physical Therapy  Referral    Return if symptoms worsen or fail to improve.    Belinda Tavarez, MS3  [employer]    Priti Fraser is a 24 year old, presenting for the following health issues:  Referral (Oral surgery. Called back in November and they did not receive any referral order) and Menstrual Problem (Had a period earlier than her usual date. LMP 2/21 and then had another one on 3/10 that lasted until 3/14. )        3/21/2024    12:51 PM   Additional Questions   Roomed by [facility]     Bria is a 23 y/o presenting with a period that is 2 weeks early.  She states her cycles are usually regular, occurring every 28-33 days. Her last two were on 2/21 and most recently on 3/10/24 which lasted 4 days and seemed to follow her  heavy bleeding, cramping pattern of cycles every other month.  Periods usually last 3-5 days, but have lengthened to 4-6 days in 2024.  First period was at age 15, started irregularly with heavy bleeding and N/V but became regular after a couple years.  She started oral form of birth control in 2019 during which she had light monthly bleeding.  Since discontinuing contraceptives in May 2023, periods have become somewhat irregular.  She is sexually active but not in a way that is conducive to pregnancy.  Also states some pain with her first Pap smear a couple weeks back.          Objective       Vitals:  No vitals were obtained today due to virtual visit.    Physical Exam   GENERAL: alert and no distress  EYES: Eyes grossly normal to inspection  RESP: No audible wheeze, cough, or visible cyanosis.    SKIN: Visible skin clear. No significant rash, abnormal pigmentation or lesions.  NEURO: Cranial nerves grossly intact.  Mentation and speech appropriate for age.  PSYCH: Appropriate affect, tone, and pace of words        Video-Visit Details    Type of service:  Video Visit   Originating Location (pt. Location): Other pt workplace    Distant Location (provider location):  On-site  Platform used for Video Visit: [employer] (Telehealth)  Signed Electronically by: Venus Aleman DO

## 2024-03-21 NOTE — PROGRESS NOTES
Preceptor Attestation:   Patient seen and evaluated via video visit. I have verified the content of the note, which accurately reflects my assessment of the patient and the plan of care.   Supervising Physician:  Calos Vieira MD.   2

## 2024-05-22 ENCOUNTER — THERAPY VISIT (OUTPATIENT)
Dept: PHYSICAL THERAPY | Facility: CLINIC | Age: 24
End: 2024-05-22
Payer: COMMERCIAL

## 2024-05-22 DIAGNOSIS — R10.2 PELVIC PAIN IN FEMALE: ICD-10-CM

## 2024-05-22 PROCEDURE — 97110 THERAPEUTIC EXERCISES: CPT | Mod: GP | Performed by: PHYSICAL THERAPIST

## 2024-05-22 PROCEDURE — 97530 THERAPEUTIC ACTIVITIES: CPT | Mod: GP | Performed by: PHYSICAL THERAPIST

## 2024-05-22 PROCEDURE — 97161 PT EVAL LOW COMPLEX 20 MIN: CPT | Mod: GP | Performed by: PHYSICAL THERAPIST

## 2024-05-22 NOTE — PROGRESS NOTES
PHYSICAL THERAPY EVALUATION  Type of Visit: Evaluation    See electronic medical record for Abuse and Falls Screening details.    Subjective       Presenting condition or subjective complaint: Pain when inserting tampons and when having pelvic exams. Had severe pain with recent pap smear 03/07/24.  Has had a hx of intense periods so was on BC for a few years. Stopped BC in 2023 and now periods are starting to get worse again. Is currently not sexually active but some pain with digital stimulation and sensitive with oral sex.   Date of onset:      Relevant medical history: Depression; Mental Illness   Dates & types of surgery: Clements tooth surgery- April 2017    Prior diagnostic imaging/testing results: Other Pelvic ultrasound   Prior therapy history for the same diagnosis, illness or injury: No      Living Environment  Social support: Alone   Type of home: Apartment/condo   Stairs to enter the home: Yes 2 Is there a railing: Yes   Ramp: No   Stairs inside the home: Yes 2 Is there a railing: Yes   Help at home: None  Equipment owned:       Employment: Yes [email or web address] Patient   Hobbies/Interests: Sewing, spending time with friends, cuddling with cat, visiting family    Patient goals for therapy: Use tampons, not experience pain, tolerate pelvic exams, intercourse when     Pain assessment:  only with vaginal penetration 9/10 pain     Objective      PELVIC EVALUATION  ADDITIONAL HISTORY:  Sex assigned at birth: Female  Gender identity: Female    Pronouns: She/Her Hers      Bladder History:  Feels bladder filling: Yes  Triggers for feeling of inability to wait to go to the bathroom: No    How long can you wait to urinate: As long as needed  Gets up at night to urinate: No    Can stop the flow of urine when urinating: Yes  Volume of urine usually released: Average   Other issues:    Number of bladder infections in last 12 months:    Fluid intake per day: At  least 32 oz      Medications taken for bladder: No     Activities causing urine leak:      Amount of urine typically leaked:    Pads used to help with leaking: No        Bowel History:  Frequency of bowel movement: 1/ every 2-3 days  Consistency of stool: Soft    Ignores the urge to defecate: Sometimes  Other bowel issues: Straining to have bowel movement  Length of time spent trying to have a bowel movement: 5+ mins    Sexual Function History:  Sexual orientation: Straight    Sexually active: Yes  Lubrication used: No No  Pelvic pain: Pelvic exams; Use of tampon    Pain or difficulty with orgasms/erection/ejaculation: Yes I don t believe i have orgasmed before.  State of menopause: Perimenopause (have not gone through menopause yet)  Hormone medications: No      Are you currently pregnant: No, Number of previous pregnancies: 0, Number of deliveries: 0, Have you been diagnosed with pelvic prolapse or abdominal separation: No, Do you get regular exercise: Yes, I do this type of exercise: Gym 1-3 times a week, Have you tried pelvic floor strengthening exercises for 4 weeks: No, Do you have any history of trauma that is relevant to your care that you d like to share: No    Discussed reason for referral regarding pelvic health needs and external/internal pelvic floor muscle examination with patient/guardian.  Opportunity provided to ask questions and verbal consent for assessment and intervention was given.      BREATHING SYMMETRY: WNL    PELVIC EXAM  External Visual Inspection:  At rest: Normal  With voluntary pelvic floor contraction: Perineal elevation    External Digital Palpation per Perineum:   Ischiocavernosis: Unremarkable  Bulbo cavernosis: Tightness, Tenderness, Pain  Transverse perineal: Tenderness  Levator ani: Tightness, Tenderness, Pain  Perineal body: Tenderness    Internal Digital Palpation:  Per Vagina:  Tenderness  Myofascial Resistance to Palpation: Patient was extremely TTP with distal tip of  provider index finger at introitus. Unable to get past introitus due to mm guarding.   Digital Muscle Performance: P (Power): visually able to see externally anal wink, perineal elevation  E (Endurance): 3-5 seconds, delayed relaxation of ~ 6-8 seconds      ABDOMINAL ASSESSMENT  Abdominal Activation/Strength:  good TA set      Assessment & Plan   CLINICAL IMPRESSIONS  Medical Diagnosis: Pelvic pain in female (R10.2)    Treatment Diagnosis:     Impression/Assessment: Patient is a 24 year old female with pelvic pain complaints.  The following significant findings have been identified: Pain, Decreased ROM/flexibility, Decreased proprioception, Impaired sensation, Inflammation, Impaired muscle performance, and Decreased activity tolerance. These impairments interfere with their ability to perform self care tasks, recreational activities, and household mobility as compared to previous level of function.     Clinical Decision Making (Complexity):  Clinical Presentation: Stable/Uncomplicated  Clinical Presentation Rationale: based on medical and personal factors listed in PT evaluation  Clinical Decision Making (Complexity): Low complexity    PLAN OF CARE  Treatment Interventions:  Modalities: Biofeedback, Cryotherapy, Ultrasound  Interventions: Manual Therapy, Neuromuscular Re-education, Therapeutic Activity, Therapeutic Exercise, Self-Care/Home Management    Long Term Goals     PT Goal 1  Goal Identifier: Kegel strength 4  Goal Description: for continence throughout the day/night for healthy hygeine      Frequency of Treatment:    Duration of Treatment:      Recommended Referrals to Other Professionals: Physical Therapy  Education Assessment:   Learner/Method: Patient;No Barriers to Learning    Risks and benefits of evaluation/treatment have been explained.   Patient/Family/caregiver agrees with Plan of Care.     Evaluation Time:             Signing Clinician: Sarah Cruz, PT,OCS

## 2024-05-25 PROBLEM — R10.2 PELVIC PAIN IN FEMALE: Status: ACTIVE | Noted: 2024-05-25

## 2024-06-02 ENCOUNTER — MYC MEDICAL ADVICE (OUTPATIENT)
Dept: FAMILY MEDICINE | Facility: CLINIC | Age: 24
End: 2024-06-02
Payer: COMMERCIAL

## 2024-06-02 DIAGNOSIS — J30.2 SEASONAL ALLERGIC RHINITIS, UNSPECIFIED TRIGGER: Primary | ICD-10-CM

## 2024-06-03 ENCOUNTER — THERAPY VISIT (OUTPATIENT)
Dept: PHYSICAL THERAPY | Facility: CLINIC | Age: 24
End: 2024-06-03
Payer: COMMERCIAL

## 2024-06-03 DIAGNOSIS — R10.2 PELVIC PAIN IN FEMALE: Primary | ICD-10-CM

## 2024-06-03 PROCEDURE — 97530 THERAPEUTIC ACTIVITIES: CPT | Mod: GP | Performed by: PHYSICAL THERAPIST

## 2024-06-03 PROCEDURE — 97140 MANUAL THERAPY 1/> REGIONS: CPT | Mod: GP | Performed by: PHYSICAL THERAPIST

## 2024-06-17 ENCOUNTER — THERAPY VISIT (OUTPATIENT)
Dept: PHYSICAL THERAPY | Facility: CLINIC | Age: 24
End: 2024-06-17
Payer: COMMERCIAL

## 2024-06-17 DIAGNOSIS — R10.2 PELVIC PAIN IN FEMALE: Primary | ICD-10-CM

## 2024-06-17 PROCEDURE — 97140 MANUAL THERAPY 1/> REGIONS: CPT | Mod: GP | Performed by: PHYSICAL THERAPIST

## 2024-07-02 ENCOUNTER — THERAPY VISIT (OUTPATIENT)
Dept: PHYSICAL THERAPY | Facility: CLINIC | Age: 24
End: 2024-07-02
Payer: COMMERCIAL

## 2024-07-02 DIAGNOSIS — R10.2 PELVIC PAIN IN FEMALE: Primary | ICD-10-CM

## 2024-07-02 PROCEDURE — 97140 MANUAL THERAPY 1/> REGIONS: CPT | Mod: GP | Performed by: PHYSICAL THERAPIST

## 2024-07-08 ENCOUNTER — MYC MEDICAL ADVICE (OUTPATIENT)
Dept: FAMILY MEDICINE | Facility: CLINIC | Age: 24
End: 2024-07-08
Payer: COMMERCIAL

## 2024-07-08 DIAGNOSIS — Z84.1 FAMILY HISTORY OF GLOMERULONEPHRITIS: ICD-10-CM

## 2024-07-08 DIAGNOSIS — E55.9 VITAMIN D DEFICIENCY: Primary | ICD-10-CM

## 2024-07-09 ENCOUNTER — OFFICE VISIT (OUTPATIENT)
Dept: DERMATOLOGY | Facility: CLINIC | Age: 24
End: 2024-07-09
Payer: COMMERCIAL

## 2024-07-09 DIAGNOSIS — K13.0 ANGULAR CHEILITIS: ICD-10-CM

## 2024-07-09 DIAGNOSIS — L21.9 SEBORRHEIC DERMATITIS: Primary | ICD-10-CM

## 2024-07-09 PROCEDURE — 99204 OFFICE O/P NEW MOD 45 MIN: CPT | Performed by: DERMATOLOGY

## 2024-07-09 RX ORDER — NAPROXEN 500 MG/1
TABLET ORAL PRN
COMMUNITY
Start: 2024-02-05

## 2024-07-09 RX ORDER — KETOCONAZOLE 20 MG/G
CREAM TOPICAL DAILY
Qty: 60 G | Refills: 11 | Status: SHIPPED | OUTPATIENT
Start: 2024-07-09

## 2024-07-09 ASSESSMENT — PAIN SCALES - GENERAL: PAINLEVEL: SEVERE PAIN (7)

## 2024-07-09 NOTE — TELEPHONE ENCOUNTER
Please call this patient and schedule her for a lab only visit at her convenience. I already placed the orders for the lab work she wants done. Thank you!

## 2024-07-09 NOTE — PROGRESS NOTES
[facility] Dermatology Note  Encounter Date: Jul 9, 2024  Office Visit     Dermatology Problem List:  1. Seborrheic dermatitis  - Current: ketoconazole 2% cream, Head&Shoulders  - Previous: hydrocortisone  2. Angular chelitis  - Current: ketoconazole 2% cream  - Previous: hydrocortisone    ____________________________________________    Assessment & Plan:     # Seborrheic dermatitis, chronic, acute flare, not-at-goal.  - Patient education provided along with a handout.  - Start ketoconazole 2% bid prn application to rash areas.  - Start Head&Shoulders shampoo as face wash at least twice a week as tolerated.  - Moisturize: Continue using Zoey-cream that previous dermatologist recommended. Advised best to apply after bathing to lock in moisture.  - Monitor: Patient to continue monitoring at home and will contact the clinic for any changes.  - Sun protection: Counseled SPF30+ sunscreen, UPF clothing, sun avoidance, tanning bed avoidance.    # Angular chelitis, chronic, acute flare, not-at-goal.  - Start ketoconazole 2% bid prn application to cracked lips.  - Continue using vaseline over the ketoconazole to increase absorption.    Procedures Performed:   None    Follow-up: 4 month(s) in-person, or earlier for new or changing lesions    Staff and Medical Student:     Tal Roche on 7/9/2024 at 8:22 AM    Staffed with: Dr. Isai MD  I was present with the medical student who participated in the service and in the documentation of the note. I have verified the history and personally performed the physical exam and medical decision making. I agree with the assessment and plan of care as documented in the note.  Dee Alex MD   ____________________________________________    CC: Derm Problem (Cracking on lips for 3 years, scaly by nose and eyelids, behind ears, earlobe. )    HPI:  Ms. Bria Hamilton is a(n) 24 year old female who presents today as a new patient for rash of the face,  ears, lips.    The patient presents with a rash that started in 2021. The rash is localized to the cheeks, ears, corners of lips, eyelids, ala and is described as red, raised, flaky. Currently not flaring. Associated symptoms during flares include bleeding when dry, hurts on lips. Not itchy. The patient has tried moisturizers, hydrocortisone, a cream for jock itch, SPF15 sunscreen, vaseline with minimal response, Head&Shoulders shampoo. Endorses dandruff on scalp but no involvement of body folds below the neck.    Patient is otherwise feeling well, without additional skin concerns.    Family Hx:  - Mom with tumid lupus  - Maternal aunt with psoriatic arthritis  - Maternal aunt with psoriasis  - Cousin on mom's side has eczema  - Paternal grandmother with rosacea    Social Hx:  - Moved from [place] last year. [facility] has records of Anti-CCP and Anti-RF antibodies.  - Grew up in [facility].     Labs Reviewed:  N/A    Physical Exam:  Vitals: There were no vitals taken for this visit.  SKIN: Focused examination of face was performed.  - xerotic ulcerated commisures bilaterally  - hyperpigmented plaques on the face bilaterally, localized to zygoma, R>L with R side pink erythema.   - There is macular erythema of the eyelids with moderate flaky white scale, R>L.   - There is macular erythema of the postauricular with mild flaky white scale.  - No other lesions of concern on areas examined.     Medications:  Current Outpatient Medications   Medication Sig Dispense Refill    amphetamine-dextroamphetamine (ADDERALL XR) 10 MG 24 hr capsule Take 10 mg by mouth daily      escitalopram (LEXAPRO) 10 MG tablet 15 mg daily 20 mg daily  when on menstrual cycle      naproxen (NAPROSYN) 500 MG tablet as needed (Patient not taking: Reported on 7/9/2024)       No current facility-administered medications for this visit.      Past Medical History:   Patient Active Problem List   Diagnosis    Prognathism    Angular  cheilitis    TERESITA (generalized anxiety disorder)    Congenital anteversion of both femurs    Pelvic pain in female     History reviewed. No pertinent past medical history.    CC Chapincito Brewster MD  [address] [place], [place] [id number] on close of this encounter.

## 2024-07-09 NOTE — LETTER
7/9/2024       RE: Bria Hamilton  [address]  [place] [address]     Dear Colleague,    Thank you for referring your patient, Bria Hamilton, to the [facility] [place] at [facility]. Please see a copy of my visit note below.    [facility] Dermatology Note  Encounter Date: Jul 9, 2024  Office Visit     Dermatology Problem List:  1. Seborrheic dermatitis  - Current: ketoconazole 2% cream, Head&Shoulders  - Previous: hydrocortisone  2. Angular chelitis  - Current: ketoconazole 2% cream  - Previous: hydrocortisone    ____________________________________________    Assessment & Plan:     # Seborrheic dermatitis, chronic, acute flare, not-at-goal.  - Patient education provided along with a handout.  - Start ketoconazole 2% bid prn application to rash areas.  - Start Head&Shoulders shampoo as face wash at least twice a week as tolerated.  - Moisturize: Continue using Zoey-cream that previous dermatologist recommended. Advised best to apply after bathing to lock in moisture.  - Monitor: Patient to continue monitoring at home and will contact the clinic for any changes.  - Sun protection: Counseled SPF30+ sunscreen, UPF clothing, sun avoidance, tanning bed avoidance.    # Angular chelitis, chronic, acute flare, not-at-goal.  - Start ketoconazole 2% bid prn application to cracked lips.  - Continue using vaseline over the ketoconazole to increase absorption.    Procedures Performed:   None    Follow-up: 4 month(s) in-person, or earlier for new or changing lesions    Staff and Medical Student:     Tal Roche on 7/9/2024 at 8:22 AM    Staffed with: Dr. Isai MD  I was present with the medical student who participated in the service and in the documentation of the note. I have verified the history and personally performed the physical exam and medical decision making. I agree with the assessment and  plan of care as documented in the note.  Dee Alex MD   ____________________________________________    CC: Derm Problem (Cracking on lips for 3 years, scaly by nose and eyelids, behind ears, earlobe. )    HPI:  Ms. Bria Hamilton is a(n) 24 year old female who presents today as a new patient for rash of the face, ears, lips.    The patient presents with a rash that started in 2021. The rash is localized to the cheeks, ears, corners of lips, eyelids, ala and is described as red, raised, flaky. Currently not flaring. Associated symptoms during flares include bleeding when dry, hurts on lips. Not itchy. The patient has tried moisturizers, hydrocortisone, a cream for jock itch, SPF15 sunscreen, vaseline with minimal response, Head&Shoulders shampoo. Endorses dandruff on scalp but no involvement of body folds below the neck.    Patient is otherwise feeling well, without additional skin concerns.    Family Hx:  - Mom with tumid lupus  - Maternal aunt with psoriatic arthritis  - Maternal aunt with psoriasis  - Cousin on mom's side has eczema  - Paternal grandmother with rosacea    Social Hx:  - Moved from [place] last year. [facility] has records of Anti-CCP and Anti-RF antibodies.  - Grew up in [facility].     Labs Reviewed:  N/A    Physical Exam:  Vitals: There were no vitals taken for this visit.  SKIN: Focused examination of face was performed.  - xerotic ulcerated commisures bilaterally  - hyperpigmented plaques on the face bilaterally, localized to zygoma, R>L with R side pink erythema.   - There is macular erythema of the eyelids with moderate flaky white scale, R>L.   - There is macular erythema of the postauricular with mild flaky white scale.  - No other lesions of concern on areas examined.     Medications:  Current Outpatient Medications   Medication Sig Dispense Refill     amphetamine-dextroamphetamine (ADDERALL XR) 10 MG 24 hr capsule Take 10 mg by mouth daily        escitalopram (LEXAPRO) 10 MG tablet 15 mg daily 20 mg daily  when on menstrual cycle       naproxen (NAPROSYN) 500 MG tablet as needed (Patient not taking: Reported on 7/9/2024)       No current facility-administered medications for this visit.      Past Medical History:   Patient Active Problem List   Diagnosis     Prognathism     Angular cheilitis     TERESITA (generalized anxiety disorder)     Congenital anteversion of both femurs     Pelvic pain in female     History reviewed. No pertinent past medical history.    CC Chapincito Brewster MD  [address] [place], [place] [id number] on close of this encounter.      Again, thank you for allowing me to participate in the care of your patient.      Sincerely,    Dee Alex MD

## 2024-07-09 NOTE — PATIENT INSTRUCTIONS
Let's try using Head&Shoulders as a face wash as well 2-3 times a week since it works well for your scalp.    We'll prescribe a cream called ketoconazole - apply to this to the areas that are affected twice a day or more as needed. We can start there and add on us needed. If there are any side effects, please send a message on [facility] to Dr. Alex.    4-month follow-up

## 2024-07-09 NOTE — NURSING NOTE
Dermatology Rooming Note    Bria Hamilton's goals for this visit include:   Chief Complaint   Patient presents with    Derm Problem     Cracking on lips for 3 years, scaly by nose and eyelids, behind ears, earlobe.      Celine Moreno, [place]

## 2024-07-25 ENCOUNTER — THERAPY VISIT (OUTPATIENT)
Dept: PHYSICAL THERAPY | Facility: CLINIC | Age: 24
End: 2024-07-25
Payer: COMMERCIAL

## 2024-07-25 DIAGNOSIS — R10.2 PELVIC PAIN IN FEMALE: Primary | ICD-10-CM

## 2024-07-25 PROCEDURE — 97035 APP MDLTY 1+ULTRASOUND EA 15: CPT | Mod: GP | Performed by: PHYSICAL THERAPIST

## 2024-07-25 PROCEDURE — 97140 MANUAL THERAPY 1/> REGIONS: CPT | Mod: GP | Performed by: PHYSICAL THERAPIST

## 2024-07-27 ENCOUNTER — LAB (OUTPATIENT)
Dept: LAB | Facility: CLINIC | Age: 24
End: 2024-07-27
Payer: COMMERCIAL

## 2024-07-27 DIAGNOSIS — Z84.1 FAMILY HISTORY OF GLOMERULONEPHRITIS: ICD-10-CM

## 2024-07-27 DIAGNOSIS — E55.9 VITAMIN D DEFICIENCY: ICD-10-CM

## 2024-07-27 LAB
ANION GAP SERPL CALCULATED.3IONS-SCNC: 8 MMOL/L (ref 7–15)
BUN SERPL-MCNC: 11.7 MG/DL (ref 6–20)
CALCIUM SERPL-MCNC: 9.3 MG/DL (ref 8.8–10.4)
CHLORIDE SERPL-SCNC: 106 MMOL/L (ref 98–107)
CREAT SERPL-MCNC: 0.78 MG/DL (ref 0.51–0.95)
EGFRCR SERPLBLD CKD-EPI 2021: >90 ML/MIN/1.73M2
GLUCOSE SERPL-MCNC: 111 MG/DL (ref 70–99)
HCO3 SERPL-SCNC: 27 MMOL/L (ref 22–29)
POTASSIUM SERPL-SCNC: 4 MMOL/L (ref 3.4–5.3)
SODIUM SERPL-SCNC: 141 MMOL/L (ref 135–145)
VIT D+METAB SERPL-MCNC: 26 NG/ML (ref 20–50)

## 2024-07-27 PROCEDURE — 36415 COLL VENOUS BLD VENIPUNCTURE: CPT

## 2024-07-27 PROCEDURE — 80048 BASIC METABOLIC PNL TOTAL CA: CPT

## 2024-07-27 PROCEDURE — 82306 VITAMIN D 25 HYDROXY: CPT

## 2024-07-29 ENCOUNTER — THERAPY VISIT (OUTPATIENT)
Dept: PHYSICAL THERAPY | Facility: CLINIC | Age: 24
End: 2024-07-29
Payer: COMMERCIAL

## 2024-07-29 DIAGNOSIS — R10.2 PELVIC PAIN IN FEMALE: Primary | ICD-10-CM

## 2024-07-29 PROCEDURE — 97140 MANUAL THERAPY 1/> REGIONS: CPT | Mod: GP | Performed by: PHYSICAL THERAPIST

## 2024-08-05 ENCOUNTER — OFFICE VISIT (OUTPATIENT)
Dept: FAMILY MEDICINE | Facility: CLINIC | Age: 24
End: 2024-08-05
Payer: COMMERCIAL

## 2024-08-05 VITALS
BODY MASS INDEX: 24.07 KG/M2 | WEIGHT: 158.8 LBS | HEIGHT: 68 IN | OXYGEN SATURATION: 97 % | HEART RATE: 87 BPM | RESPIRATION RATE: 20 BRPM | TEMPERATURE: 97.5 F

## 2024-08-05 DIAGNOSIS — M24.80 GENERALIZED HYPERMOBILITY OF JOINTS: ICD-10-CM

## 2024-08-05 DIAGNOSIS — Z84.1 FAMILY HISTORY OF GLOMERULONEPHRITIS: ICD-10-CM

## 2024-08-05 DIAGNOSIS — M25.50 MULTIPLE JOINT PAIN: Primary | ICD-10-CM

## 2024-08-05 LAB
CREAT UR-MCNC: 152 MG/DL
MICROALBUMIN UR-MCNC: <12 MG/L
MICROALBUMIN/CREAT UR: NORMAL MG/G{CREAT}

## 2024-08-05 PROCEDURE — 82043 UR ALBUMIN QUANTITATIVE: CPT

## 2024-08-05 PROCEDURE — G2211 COMPLEX E/M VISIT ADD ON: HCPCS

## 2024-08-05 PROCEDURE — 99213 OFFICE O/P EST LOW 20 MIN: CPT | Mod: GC

## 2024-08-05 PROCEDURE — 82570 ASSAY OF URINE CREATININE: CPT

## 2024-08-05 NOTE — PROGRESS NOTES
Assessment & Plan     Family history of glomerulonephritis  Patient's father recently diagnosed with C3GN, his nephrologist recommended monitoring kidney function in first-degree relatives as there is some concern for genetic component.  Recent BMP within normal limits, patient requesting urine albumin today.  Asymptomatic.   - Albumin Random Urine Quantitative with Creat Ratio    Multiple joint pain  Generalized hypermobility of joints  Chronic problem for patient, previously seen in [place] by rheumatology.  Per chart review, lab work done in 11/2022 showed negative RF, normal ESR and CRP, positive CCP and normal hand x-ray.  Recommend presenting to [facility] for rheumatology evaluation, no referral required, and considering hand therapy/other forms of PT depending on results/diagnosis.         Return if symptoms worsen or fail to improve.      The longitudinal plan of care for the diagnosis(es)/condition(s) as documented were addressed during this visit. Due to the added complexity in care, I will continue to support Bria in the subsequent management and with ongoing continuity of care.       Priti Fraser is a 24 year old, presenting for the following health issues:  Joint Pain (Tightness and achy most in both hands. )        8/5/2024     8:01 AM   Additional Questions   Roomed by    Accompanied by self         8/5/2024    Information    services provided? No        HPI     24 year old female with history of prognathism and hypermobility presents with complaint of bilateral chronic hand pain   This pain has been intermittent for 4 years with pain occurring now most days    Previously was seen for this as a [facility] patient  Reports having positive lab work and meeting with rheumatologist who told her she did not qualify for diagnosis due to not having a true flare    She reports weakness and muscle aches with prolonged hand gripping   Worse in the morning or when she  "wakes up, can't open jar or zip loc for short time after she wakes up even if it is from a nap    This pain and weakness impacts her ability to drive for long distances  The pain is better with stretching and rest  Doesn't affect her typing or texting fortunately  Pain is worse with cold weather    With her history of hypermobility, she more recently feels as though her bones are \"out of place\"  Mostly affects her R hip, bilateral knees, bilateral hands and feet          Objective    Pulse 87   Temp 97.5  F (36.4  C) (Tympanic)   Resp 20   Ht 1.715 m (5' 7.52\")   Wt 72 kg (158 lb 12.8 oz)   LMP 07/16/2024   SpO2 97%   BMI 24.49 kg/m    Body mass index is 24.49 kg/m .  Physical Exam  Vitals reviewed.   Constitutional:       General: She is not in acute distress.     Appearance: Normal appearance.   Pulmonary:      Effort: Pulmonary effort is normal. No respiratory distress.      Breath sounds: Normal breath sounds.   Musculoskeletal:      Right hand: Normal. No swelling or deformity. Normal range of motion.      Left hand: Normal. No swelling or deformity. Normal range of motion.   Neurological:      Mental Status: She is alert.          Signed Electronically by: Venus Aleman DO PGY3    "

## 2024-08-05 NOTE — PATIENT INSTRUCTIONS
[facility]  expert team of rheumatologists has decades of experience treating patients in the [facility] community. They evaluate, diagnose, and treat inflammatory conditions affecting the immune and musculoskeletal systems. [facility] offers patients quick access to appointments with our rheumatology specialists. To Schedule: Call [phone] or click here to schedule online. No referral needed.    Rheumatology  Specialty Service  [facility] ([email or web address])

## 2024-08-20 ENCOUNTER — THERAPY VISIT (OUTPATIENT)
Dept: PHYSICAL THERAPY | Facility: CLINIC | Age: 24
End: 2024-08-20
Payer: COMMERCIAL

## 2024-08-20 DIAGNOSIS — R10.2 PELVIC PAIN IN FEMALE: Primary | ICD-10-CM

## 2024-08-20 PROCEDURE — 97110 THERAPEUTIC EXERCISES: CPT | Mod: GP | Performed by: PHYSICAL THERAPIST

## 2024-08-20 PROCEDURE — 97140 MANUAL THERAPY 1/> REGIONS: CPT | Mod: GP | Performed by: PHYSICAL THERAPIST

## 2024-08-26 DIAGNOSIS — M25.50 MULTIPLE JOINT PAIN: Primary | ICD-10-CM

## 2024-08-26 DIAGNOSIS — M24.80 GENERALIZED HYPERMOBILITY OF JOINTS: ICD-10-CM

## 2024-08-27 ENCOUNTER — THERAPY VISIT (OUTPATIENT)
Dept: PHYSICAL THERAPY | Facility: CLINIC | Age: 24
End: 2024-08-27
Payer: COMMERCIAL

## 2024-08-27 DIAGNOSIS — R10.2 PELVIC PAIN IN FEMALE: Primary | ICD-10-CM

## 2024-08-27 PROCEDURE — 97110 THERAPEUTIC EXERCISES: CPT | Mod: GP | Performed by: PHYSICAL THERAPIST

## 2024-08-27 PROCEDURE — 97140 MANUAL THERAPY 1/> REGIONS: CPT | Mod: GP | Performed by: PHYSICAL THERAPIST

## 2024-09-16 ENCOUNTER — THERAPY VISIT (OUTPATIENT)
Dept: PHYSICAL THERAPY | Facility: CLINIC | Age: 24
End: 2024-09-16
Payer: COMMERCIAL

## 2024-09-16 DIAGNOSIS — R10.2 PELVIC PAIN IN FEMALE: Primary | ICD-10-CM

## 2024-09-16 PROCEDURE — 97530 THERAPEUTIC ACTIVITIES: CPT | Mod: GP | Performed by: PHYSICAL THERAPIST

## 2024-09-16 PROCEDURE — 97140 MANUAL THERAPY 1/> REGIONS: CPT | Mod: GP | Performed by: PHYSICAL THERAPIST

## 2024-09-17 ENCOUNTER — THERAPY VISIT (OUTPATIENT)
Dept: OCCUPATIONAL THERAPY | Facility: CLINIC | Age: 24
End: 2024-09-17
Payer: COMMERCIAL

## 2024-09-17 DIAGNOSIS — M25.50 MULTIPLE JOINT PAIN: ICD-10-CM

## 2024-09-17 DIAGNOSIS — M24.80 GENERALIZED HYPERMOBILITY OF JOINTS: ICD-10-CM

## 2024-09-17 PROCEDURE — 97165 OT EVAL LOW COMPLEX 30 MIN: CPT | Mod: GO | Performed by: OCCUPATIONAL THERAPIST

## 2024-09-17 PROCEDURE — 97140 MANUAL THERAPY 1/> REGIONS: CPT | Mod: GO | Performed by: OCCUPATIONAL THERAPIST

## 2024-09-17 PROCEDURE — 97112 NEUROMUSCULAR REEDUCATION: CPT | Mod: GO | Performed by: OCCUPATIONAL THERAPIST

## 2024-09-17 NOTE — PROGRESS NOTES
OCCUPATIONAL THERAPY EVALUATION  Type of Visit: Evaluation       Fall Risk Screen:  Fall screen completed by: OT  Have you fallen 2 or more times in the past year?: No  Have you fallen and had an injury in the past year?: No  Is patient a fall risk?: No    Subjective      Presenting condition or subjective complaint: Hand pain/weakness  Date of onset: 08/26/24 (MD order date, pt states started ~4 years ago)    Relevant medical history: Mental Illness; Rheumatoid arthritis   Dates & types of surgery: April 2018- wisdom tooth removal    Prior diagnostic imaging/testing results:     none recently  Prior therapy history for the same diagnosis, illness or injury: No      Prior Level of Function  Transfers: Independent  Ambulation: Independent  ADL: Independent    Living Environment  Social support: Alone   Type of home: Apartment/condo   Stairs to enter the home: No       Ramp: Yes   Stairs inside the home: No       Help at home: None  Equipment owned: [employer]     Employment: Yes [employer] with [employer]  Hobbies/Interests: Sewing, hanging with friends, playing piano, chillin w my cat    Patient goals for therapy: Use my hands more, play the keys,  things(like cleaning or driving)       Objective   ADDITIONAL HISTORY:  Right hand dominant  Patient reports symptoms of pain, stiffness/loss of motion, weakness/loss of strength, and edema  Transportation: drives  Currently working in normal job without restrictions    Functional Outcome Measure:   Upper Extremity Functional Index Score:  SCORE:   Column Totals: /80: 60   (A lower score indicates greater disability.)    PAIN:  Pain Level at Rest: 0/10  Pain Level with Use: 5/10  Pain Location: IP joints of all fingers B (more dorsally than volarly), R dorsal radial wrist  Pain Quality: Burning and Tingling  Pain Frequency: intermittent  Pain is Worst: daytime  Pain is Exacerbated By: sleeping, playing piano, prolonged gripping (driving), washing  hair  Pain is Relieved By: rest and stretch  Pain Progression: Worsened    EDEMA:  none visible, pt reports hands feel swollen      ROM:  BUE AROM is WNL with hyperextension present    OBSERVATIONS/APPEARANCE:  hyperextension present in IP joints of fingers, MP joints of fingers and with elbow extension B.       NEURAL TENSION TESTING: MNT: Median Neurodynamic Test (based on DS Porras's ULNT)   9/19/2024   0-5 Scale L: 5/5  R: 5/5   Position:   0/5: Arm across abdomen in coronal plane  1/5: Depress shoulder, ER to neutral ABD shoulder to 45 degrees  2/5: ER shoulder to end range, keep elbow at 90 degrees  3/5: Extend elbow to 0 degrees  4/5: Fully supinate forearm  5/5: Extend wrist, fingers and thumb  Notes:  (+) indicates beyond grade level but less than FDC to next level  (-) indicates over FDC to level  S1 onset/change of patient's symptoms  S2 definite stop point based on patient's discomfort level    RNT: Radial Neurodynamic Test (based on DS Porras's ULNT)   9/19/2024   0-5 Scale L: 4/5  R: 4/5   Position:   0/5: Arm across abdomen in coronal plane  1/5: Depress shoulder, ER to neutral ABD shoulder to 45 degrees  2/5: IR shoulder to end range, keep elbow at 90 degrees  3/5: Extend elbow to 0 degrees  4/5: Fully pronate forearm  5/5: Flex wrist and fingers with UD  Notes:  (+) indicates beyond grade level but less than FDC to next level  (-) indicates over FDC to level  S1 onset/change of patient's symptoms  S2 definite stop point based on patient's discomfort level    UNT: Ulnar Neurodynamic Test (based on DS Porras's ULNT)   9/19/2024   0-5 Scale L: 5/5  R: 5/5   Position:   0/5: Arm across abdomen in coronal plane  1/5: ER to neutral ABD shoulder to 45 degrees  2/5: ER shoulder to 90 degrees  3/5: Block shoulder and ABD shoulder to 110 degrees  4/5: Fully pronate forearm, extend wrist and ring and small fingers  5/5: Flex elbow and bring hand to side of face  Notes:  (+) indicates beyond  grade level but less than correction to next level  (-) indicates over correction to level  S1 onset/change of patient's symptoms  S2 definite stop point based on patient's discomfort level    STRENGTH:     Measured in pounds 9/17/2024 9/17/2024    Left Right   Trial 1 52 60   Average 52 60     Lateral Pinch  Measured in pounds 9/17/2024 9/17/2024    Left Right   Trial 1 11 15   Average 11 15     3 Point Pinch  Measured in pounds 9/17/2024 9/17/2024    Left Right   Trial 1 12 13   Average 12 13     Assessment & Plan   CLINICAL IMPRESSIONS  Medical Diagnosis: B hand multiple joint pain    Treatment Diagnosis: B hand pain    Impression/Assessment: Pt is a 24 year old female presenting to Occupational Therapy due to gradual onset of B hand pain.  The following significant findings have been identified: Impaired activity tolerance, Impaired strength, and Pain.  These identified deficits interfere with their ability to perform recreational activities, household chores, driving , and meal planning and preparation as compared to previous level of function.   Patient's limitations or Problem List includes: Pain, Weakness, Hypermobility, Decreased , Decreased pinch, Decreased coordination, and Decreased dexterity of the bilateral wrist and hand which interferes with the patient's ability to perform Sleep Patterns, Recreational Activities, Household Chores, and Driving  as compared to previous level of function.    Clinical Decision Making (Complexity):  Assessment of Occupational Performance: 5 or more Performance Deficits  Occupational Performance Limitations: driving and community mobility, home establishment and management, meal preparation and cleanup, sleep, and leisure activities  Clinical Decision Making (Complexity): Low complexity    PLAN OF CARE  Treatment Interventions:  Therapeutic Exercise:  Isotonics, Isometrics, and Stabilization  Neuromuscular re-education:  Nerve Gliding, Kinesthetic Training,  Proprioceptive Training, and Kinesiotaping  Manual Techniques:  Myofascial release  Orthotic Fabrication:  Static, Hand based, and Forearm based  Self Care:  Ergonomic Considerations    Long Term Goals   OT Goal 1  Goal Identifier: Household Chores  Goal Description: Pt will be able to open a new jar without pain in order to maximize independence with ADLs  Target Date: 10/31/24      Frequency of Treatment: 1x/week  Duration of Treatment: 6 weeks     Recommended Referrals to Other Professionals:  none  Education Assessment: Learner/Method: Patient;No Barriers to Learning     Risks and benefits of evaluation/treatment have been explained.   Patient/Family/caregiver agrees with Plan of Care.     Evaluation Time:    OT Eval, Low Complexity Minutes ([id number]): 25    Signing Clinician: Tasia Rowell OT

## 2024-09-19 PROBLEM — M24.80 GENERALIZED HYPERMOBILITY OF JOINTS: Status: ACTIVE | Noted: 2024-09-19

## 2024-09-19 PROBLEM — M25.50 MULTIPLE JOINT PAIN: Status: ACTIVE | Noted: 2024-09-19

## 2024-10-01 ENCOUNTER — THERAPY VISIT (OUTPATIENT)
Dept: OCCUPATIONAL THERAPY | Facility: CLINIC | Age: 24
End: 2024-10-01
Payer: COMMERCIAL

## 2024-10-01 DIAGNOSIS — M24.80 GENERALIZED HYPERMOBILITY OF JOINTS: Primary | ICD-10-CM

## 2024-10-01 PROCEDURE — 97140 MANUAL THERAPY 1/> REGIONS: CPT | Mod: GO | Performed by: OCCUPATIONAL THERAPIST

## 2024-10-01 PROCEDURE — 97112 NEUROMUSCULAR REEDUCATION: CPT | Mod: GO | Performed by: OCCUPATIONAL THERAPIST

## 2024-10-07 ENCOUNTER — OFFICE VISIT (OUTPATIENT)
Dept: ORTHOPEDICS | Facility: CLINIC | Age: 24
End: 2024-10-07
Payer: COMMERCIAL

## 2024-10-07 ENCOUNTER — THERAPY VISIT (OUTPATIENT)
Dept: PHYSICAL THERAPY | Facility: CLINIC | Age: 24
End: 2024-10-07
Payer: COMMERCIAL

## 2024-10-07 ENCOUNTER — ANCILLARY PROCEDURE (OUTPATIENT)
Dept: GENERAL RADIOLOGY | Facility: CLINIC | Age: 24
End: 2024-10-07
Attending: PHYSICIAN ASSISTANT
Payer: COMMERCIAL

## 2024-10-07 DIAGNOSIS — M25.531 WRIST PAIN, ACUTE, RIGHT: Primary | ICD-10-CM

## 2024-10-07 DIAGNOSIS — M25.531 WRIST PAIN, ACUTE, RIGHT: ICD-10-CM

## 2024-10-07 DIAGNOSIS — R10.2 PELVIC PAIN IN FEMALE: Primary | ICD-10-CM

## 2024-10-07 PROCEDURE — 97140 MANUAL THERAPY 1/> REGIONS: CPT | Mod: GP | Performed by: PHYSICAL THERAPIST

## 2024-10-07 PROCEDURE — 99203 OFFICE O/P NEW LOW 30 MIN: CPT | Performed by: PHYSICIAN ASSISTANT

## 2024-10-07 PROCEDURE — 73110 X-RAY EXAM OF WRIST: CPT | Mod: TC | Performed by: RADIOLOGY

## 2024-10-07 NOTE — LETTER
10/7/2024      Bria Hamilton  [address]  [facility] [address]      Dear Colleague,    Thank you for referring your patient, Bria Hamilton, to the [facility] [place]. Please see a copy of my visit note below.    ASSESSMENT & PLAN  Bria Hamilton seen today for her right wrist pain.  We discussed that her x-rays are negative for acute fracture.  At this time recommend conservative management with over-the-counter topical creams, heat, ice, Tylenol and ibuprofen as needed.  We discussed the role of occupational therapy and a referral was placed today to work on exercises stretches and pain modalities as needed.  Should her symptoms fail to improve or worsen with this would recommend a follow-up with sports medicine for repeat evaluation and possible further imaging.  Patient was understanding of this plan and agreement.  All questions answered.    Mojgan Vann PA-C  [facility]    -----  Chief Complaint   Patient presents with     Right Wrist - Pain       SUBJECTIVE  Bria Hamilton is a/an 24 year old right-handed female who is seen as a WALK IN patient for evaluation of right wrist pain.  Onset was this morning; similar episode one week ago. Pain is located radial volar. Symptoms are worsened by certain movements.  She has tried stretching of the wrists. She states she is in hand therapy for burning/numbness in the morning. She denies any swelling    The patient is seen alone    Prior injury/Surgical history of affected joint: Denies  Social History/Occupation: [employer] at [employer]     REVIEW OF SYSTEMS:  Pertinent positives/negative: As stated above in HPI    OBJECTIVE:  There were no vitals taken for this visit.   General: Alert and in no distress  Skin: no visable rashes  CV: Extremities appear well perfused   Resp: normal respiratory effort, no conversational dyspnea   Psych: normal mood, affect  MSK: RIGHT  WRIST    Inspection:  There is no evidence of open wounds.   There is no evidence of erythema or infection  There is no appreciable swelling or synovitis.    Palpation:  There is no palpable fluctuance  There is tenderness to palpation at radial volar about the FCR insertion region  There is no tenderness to palpation at dorsal radiocarpal, ulnocarpal     Motor:  Intact in the median, radial, and ulnar nerve distributions    Sensory:  Intact to light touch in the median, radial, and ulnar nerve distributions    Radial pulse is palpable and equal to contralateral side    Range of Motion:  Flexion: 90 degrees  Extension: 70 degrees  Radial deviation:15 degrees  Ulnar deviation: 10 degrees        RADIOLOGY:  Final results and radiologist's interpretation available in the [facility] health record.  Images below were personally reviewed and discussed with the patient in the office today.  My personal interpretation of the performed imaging: Right wrist x-rays show no acute fracture or dislocation.        Again, thank you for allowing me to participate in the care of your patient.        Sincerely,        Mojgan Vann PA-C

## 2024-10-07 NOTE — PATIENT INSTRUCTIONS
1. Wrist pain, acute, right        - Seen today for right wrist pain, X-rays show no fractures.     Recommendations  - OTC Tylenol 1000 mg and Ibuprofen 600 mg every 8 hours for pain.   -Topical creams such as Voltaren gel, Asperecreme, Biofreeze, china gel, or blue emu.   - Apply ice first 72 hours post injury, then alternate heat and ice  -Compression wraps or braces  -Tumeric OTC, natural inflammation reducer  - Referral to hand therapy  -Follow up with sports medicine if not improving or symptoms worsen    -Call direct clinic number [[phone]] at any time with questions or concerns.    -Orthopedic Walk in Monday through Thursday 8 am to 6 pm, Friday 8 am to 5 pm, Saturday 8 am to 12 pm at [address] [facility] [address] [place].     · From my review, patient likely has chronic anemia  · Stable  · No active bleeding  · Monitor  · For further evaluation and management if this worsens significantly

## 2024-10-07 NOTE — PROGRESS NOTES
ASSESSMENT & PLAN  Bria Hamilton seen today for her right wrist pain.  We discussed that her x-rays are negative for acute fracture.  At this time recommend conservative management with over-the-counter topical creams, heat, ice, Tylenol and ibuprofen as needed.  We discussed the role of occupational therapy and a referral was placed today to work on exercises stretches and pain modalities as needed.  Should her symptoms fail to improve or worsen with this would recommend a follow-up with sports medicine for repeat evaluation and possible further imaging.  Patient was understanding of this plan and agreement.  All questions answered.    Mojgan Vann PA-C  [facility]    -----  Chief Complaint   Patient presents with    Right Wrist - Pain       SUBJECTIVE  Bria Hamilton is a/an 24 year old right-handed female who is seen as a WALK IN patient for evaluation of right wrist pain.  Onset was this morning; similar episode one week ago. Pain is located radial volar. Symptoms are worsened by certain movements.  She has tried stretching of the wrists. She states she is in hand therapy for burning/numbness in the morning. She denies any swelling    The patient is seen alone    Prior injury/Surgical history of affected joint: Denies  Social History/Occupation: [employer] workforce [employer] at [employer]     REVIEW OF SYSTEMS:  Pertinent positives/negative: As stated above in HPI    OBJECTIVE:  There were no vitals taken for this visit.   General: Alert and in no distress  Skin: no visable rashes  CV: Extremities appear well perfused   Resp: normal respiratory effort, no conversational dyspnea   Psych: normal mood, affect  MSK: RIGHT WRIST    Inspection:  There is no evidence of open wounds.   There is no evidence of erythema or infection  There is no appreciable swelling or synovitis.    Palpation:  There is no palpable fluctuance  There is tenderness to palpation at radial volar about the  FCR insertion region  There is no tenderness to palpation at dorsal radiocarpal, ulnocarpal, radial styloid    Motor:  Intact in the median, radial, and ulnar nerve distributions    Sensory:  Intact to light touch in the median, radial, and ulnar nerve distributions    Radial pulse is palpable and equal to contralateral side    Range of Motion:  Flexion: 90 degrees  Extension: 70 degrees  Radial deviation:15 degrees  Ulnar deviation: 10 degrees        RADIOLOGY:  Final results and radiologist's interpretation available in the [facility] health record.  Images below were personally reviewed and discussed with the patient in the office today.  My personal interpretation of the performed imaging: Right wrist x-rays show no acute fracture or dislocation.

## 2024-10-08 NOTE — PROGRESS NOTES
10/07/24 0500   Appointment Info   Signing clinician's name / credentials Marcie Cespedes, PT, OCS   Total/Authorized Visits EPIC  03/22/24   Visits Used 10   Medical Diagnosis Pelvic pain in female (R10.2)   PT Tx Diagnosis vaginismus, dyspareunia   Precautions/Limitations PT 10 min late   Other pertinent information VERY GENTLE, move slowly   Quick Adds Pelvic Consent   Progress Note/Certification   Progress Note Due Date 12/07/24   Progress Note Completed Date 10/07/24   GOALS   PT Goals 2   PT Goal 1   Goal Identifier Painfree tampon use, intimacy, pelvic exams   Goal Description D1 or less   Rationale to maximize safety and independence within the home;to maximize safety and independence with performance of ADLs and functional tasks;to maximize safety and independence with self cares   Goal Progress current D2, the same but dilator size increasing (3)   Target Date 12/03/24   Subjective Report   Subjective Report Had severe R wrist pain this morning, went to walk-in clinic/-x-rays. Worked up to 3rd dilator, went ok. WAs going to try tampon but flow was too light by time she tried. Getting past opening with dilator is the toughest but feels like is slowly making progress.   Objective Measures   Objective Measures Objective Measure 1   Objective Measure 1   Objective Measure Able to get past 1st layer nicely, ~ 3 seconds to pass introitus.   Details UGT mod tension, LA/OI min tension   Treatment Interventions (PT)   Interventions Manual Therapy   Therapeutic Procedure/Exercise   Ther Proc 1 SLR abduction x 20 AG   Ther Proc 1 - Details bridge with band ER/IR x5 reps, 10 x   PTRx Ther Proc 1 Adductor Stretch   PTRx Ther Proc 1 - Details 30 sec x 3 B, also sit back, 1-2 x day   PTRx Ther Proc 2 Pretzel Stretch   PTRx Ther Proc 2 - Details 20 sec x 3-5 reps each leg, 1-2 x day   Skilled Intervention to improved relaxation of PF and strength hips/core   Patient Response/Progress good demonstration    Therapeutic Activity   Therapeutic Activities Ther Act 2;Ther Act 3   Ther Act 1 educated in POC and normal pelvic floor anatomy/function   Ther Act 1 - Details Begin dilator #1, 5-10 minutes, focus on relaxation, 3 x week   Ther Act 2 seated pelvic floor relaxation position   Ther Act 2 - Details check selt throughout day for PF tension, avoid leg crossing.   Ther Act 3 Discussed gym at home: elliptical/tm or bike for cardio 30 min, 5 x week, rev'd all other upper and lower body equipment   PTRx Ther Act 1 Overactive Dysfunction and Suggestions to Reduce Overactive Dysfunction   PTRx Ther Act 1 - Details belly breathing 5-10 min daily   Skilled Intervention to decrease tone to pelvic floor.   Patient Response/Progress good verbal understanding   Manual Therapy   Manual Therapy: Mobilization, MFR, MLD, friction massage minutes (54972) 25   Manual Therapy 1 supine MFR to pelvic floor, start slowly at UGT/then to LA/OI   Skilled Intervention to decrease PF tone   Patient Response/Progress much improved tolerance to MFR.   Education   Learner/Method Patient;No Barriers to Learning   Plan   Home program PTRX HEP   Plan for next session continue with MFR and dilator work at home. F/u every ~3 weeks   Comments   Pelvic Health Informed Consent Statement Discussed with patient/guardian reason for referral regarding pelvic health needs and external/internal pelvic floor muscle examination.  Opportunity provided to ask questions and verbal consent for assessment and intervention was given.   Total Session Time   Timed Code Treatment Minutes 25   Total Treatment Time (sum of timed and untimed services) 25         PLAN  Continue therapy per current plan of care.    Beginning/End Dates of Progress Note Reporting Period:  10/07/24 to 10/07/2024    Referring Provider:  Calos Vieira

## 2024-10-14 ENCOUNTER — OFFICE VISIT (OUTPATIENT)
Dept: ALLERGY | Facility: CLINIC | Age: 24
End: 2024-10-14
Attending: FAMILY MEDICINE
Payer: COMMERCIAL

## 2024-10-14 VITALS — OXYGEN SATURATION: 99 % | WEIGHT: 151.6 LBS | HEART RATE: 92 BPM | BODY MASS INDEX: 22.97 KG/M2 | HEIGHT: 68 IN

## 2024-10-14 DIAGNOSIS — H10.9 RHINOCONJUNCTIVITIS: Primary | ICD-10-CM

## 2024-10-14 DIAGNOSIS — J30.2 SEASONAL ALLERGIC RHINITIS, UNSPECIFIED TRIGGER: ICD-10-CM

## 2024-10-14 DIAGNOSIS — J31.0 RHINOCONJUNCTIVITIS: Primary | ICD-10-CM

## 2024-10-14 PROCEDURE — 95004 PERQ TESTS W/ALRGNC XTRCS: CPT

## 2024-10-14 PROCEDURE — 99204 OFFICE O/P NEW MOD 45 MIN: CPT | Mod: 25

## 2024-10-14 RX ORDER — ESCITALOPRAM OXALATE 10 MG/1
TABLET ORAL
COMMUNITY
Start: 2024-04-30

## 2024-10-14 NOTE — LETTER
10/14/2024      Bria Hamilton  [address]  [facility] [address]      Dear Colleague,    Thank you for referring your patient, Bria Hamilton, to the [facility]. Please see a copy of my visit note below.    Bria Hamilton was seen in the Allergy Clinic at [facility].    Bria Hamilton is a 24 year old female  being seen today for ongoing evaluation of Seasonal allergic rhinitis.  Referral from,    Yazmin Matos MD [facility]     Hi,      I was wondering if I could get a referral to an allergist? My allergies have been pretty bad in the last few years and I would like to get tested. I did get tested when I was 9 and was found to be allergic to grass seed and one type of dust mites. The last few years my allergies have gotten so bad I have to take 1-2 pills of 10 mg of allergy meds. This year I have had to take 2 pills most days (one in morning and another at night). There have been a few days this year that my allergies have caused my eyes to hurt and weep so much that it is hard to see out of them. Let me know if you can put in the referral or if I need to schedule an appointment first.        History of Present Illness:     Rhinoconjunctivitis    Patient reports she has lived in a variety environments throughout the [place].  She reports she returned to the area approximately 1 year ago, and had significant allergy symptoms.  She reports in her childhood she lived in the [facility] for approximately 9 years.    Patient reports she has had to take twice daily antihistamines, and used eyedrops for her significant allergy symptoms.  She reports she typically has crusting, and needs to use a warm compress to remove the gunk that is present in her eyes secondary to her rhinoconjunctivitis.    Patient reports she was allergy tested at age 9, found grass pollen and dust mite  allergy.  The onset of symptoms: Present since childhood  Perennial/seasonally-exacerbated (Spring and Perennial) chronic nasal symptoms (itch, clear rhinorrhea, stuffiness, and sneezing), postnasal drip and ocular symptoms (itching, redness, watering, discharge and burning).  Nasal sprays fluticasone have been used, and they were partially effective. Oral antihistamines loratadine and cetirizine, twice daily have been used. They were partially effective.   There is no history of PE tubes, sinus surgeries, or tonsillectomy/adenoidectomy.   No problems with her sense of smell.  Patient denies family history of nasal polyposis.  Patient endorses that her father also has a significant grass allergy.    [facility]    Question 10/9/2024 10:48 PM CDT - Filed by Patient   Reason for visit: Nasal or sinus symptoms    Eye symptoms   Nasal/ Sinus/ Eye Symptoms    When did your symptoms begin? On and off thru spring and summer.   What symptoms do you have? Itchy eyes    Watery eyes    Sneezing    Runny nose    Stuffy nose    Drainage down the throat    Ear pressure    Poor sense of smell   Any prior sinus surgeries? No   History of nasal polyps? No   History of sinus infections? No   Have you tried pills/oral medications? Yes   Which ones? Generic for zrytec and claritin   Have you tried nasal sprays? Yes   Which ones? Fluticasone propionate   Have you used eye drops? Yes   Which ones? Opcon A   When do symptoms occur? Year-round    Indoors    Outdoors   What makes symptoms worse? Dust    Mowing the lawn    Raking leaves    Temperature changes    Scents/perfumes   Environmental and Social History    Place of Residence: Apartment   Do you have Central Air Conditioning? No   Type of Heating System: Radiator   Wood burning stove or fireplace: No   Occupation: [employer] Patient - [employer]   Pets: Yes   Number and type of pets: 1 cat   Do you smoke cigarettes: No   Do you  use an e-cigarette or vape? No   Does anyone living in your home smoke or vape? No   Family History    Do your parents, siblings, or children have asthma? Yes   Who? Mom, brother and sister have asthma   Do your parents, siblings, or children have environmental allergies? Yes   Who? Dad had severe grass allergies. Brother has dustmite allergies   Do your parents, siblings, or children have food allergies? Yes   Who? Dad has slight milk allergy   Do your parents, siblings, or children have eczema? No       Past Medical History:   Diagnosis Date     ADHD (attention deficit hyperactivity disorder)      Angular cheilitis     Follows with dermatology     Congenital anteversion of both femurs      TERESITA (generalized anxiety disorder)      Prognathism        No past surgical history on file.      Current Outpatient Medications:      amphetamine-dextroamphetamine (ADDERALL XR) 10 MG 24 hr capsule, Take 10 mg by mouth daily, Disp: , Rfl:      escitalopram (LEXAPRO) 10 MG tablet, 15 mg daily 20 mg daily  when on menstrual cycle, Disp: , Rfl:      ketoconazole (NIZORAL) 2 % external cream, Apply topically daily, Disp: 60 g, Rfl: 11     naproxen (NAPROSYN) 500 MG tablet, as needed., Disp: , Rfl:   No Known Allergies      Family History   Problem Relation Age of Onset     Glomerulonephritis Father         C3GN     Skin Cancer No family hx of        EXAM:   There were no vitals taken for this visit.    Physical Exam  Constitutional:       General: She is not in acute distress.     Appearance: Normal appearance. She is not ill-appearing.   HENT:      Nose: Congestion and rhinorrhea present. No nasal deformity or septal deviation.      Right Turbinates: Not enlarged, swollen or pale.      Left Turbinates: Not enlarged, swollen or pale.      Comments: Moderate nasal turbinate enlargement     Mouth/Throat:      Mouth: Mucous membranes are moist.      Pharynx: Oropharynx is clear. Uvula midline. No posterior oropharyngeal erythema.       Tonsils: 0 on the right. 0 on the left.   Eyes:      General: No allergic shiner.        Right eye: No discharge.         Left eye: No discharge.      Conjunctiva/sclera: Conjunctivae normal.   Cardiovascular:      Rate and Rhythm: Normal rate and regular rhythm.      Heart sounds: Normal heart sounds, S1 normal and S2 normal.   Pulmonary:      Effort: Pulmonary effort is normal. No prolonged expiration.      Breath sounds: Normal breath sounds. No decreased air movement. No wheezing.   Skin:     Findings: Erythema: .krtes.   Neurological:      Mental Status: She is alert.   Psychiatric:         Mood and Affect: Mood normal.         Behavior: Behavior normal.         Judgment: Judgment normal.         WORKUP: Skin testing, appropriate positive controls       Percutaneous    Environmental Testing     Ordering Provider :  Leticia Lilly PA-C    Testing Technician : MN    Date: 10/14/2024      Time: 3:33 PM       (W/F in millimeters)    Allergen      Concentration : Manufacture     Terrance, white  1:20 H   0/0   Birch mix 1:20 H 0/0   [place], common 1:20 H 0/0   4. Elm, American 1:20 H 0/0   5. [place], Shagbark 1:20 H 0/0   6. Maple, Hard/Sugar 1:20 H 0/0   7. [place] Mix 1:20 H 0/0   8. Oak, Red 1:20 H 0/0   9. [place], American 1:20 H 0/0   10. Ulysses Tree 1:20 H 0/0   11. Dp Mite 30,000 A U /ML H 15/45   12. Df Mite 30,000 A U /ML H 13/45   13. Grass Mix # 4 10,000 B A U /ML H 10/35   14. Myles grass 1:20 H 13/45   15.Cockroach mix 1:10 H 0/0   16. Alternaria Tenuis 1:10 H 0/0   17. Aspergillus Fumigatus 1:10 H 0/0   18. Homodendrum cladosporioides 1:10 H 0/0   19. Penicillium notatum 1:10 H 0/0   20. Epicoccum 1:10 H 0/0   21. Ragweed, Short 1:20 H 0/0   22. Dock, Sorrel 1:20 H 0/0   23. Lamb's Quarters 1:20 H 5/20   24. Pigweed, Rough Redroot 1:20 H 0/0   25. Plantain, English 1:20 H 0/0   26. Sagebrush, Gosiawort 1:20 H 0/0   27. Cat 10,000  B A U /ML H 0/0   28. AP Dog 1:100 H 0/0   29. Neg. Control: 50%  glycerine/saline H 0/0   30. Pos. Control: histamine 6mg/ML 7/15   31.  Horse  0/0     At today's visit, the patient and I engaged in an informed consent discussion about allergy testing.  We discussed skin testing, blood testing, and the alternative of not undergoing any testing. The patient has a preference for skin testing. We then discussed the risks and benefits of skin testing.  The patient understands skin testing risks can include, but are not limited to, urticaria, angioedema, shortness of breath, and severe anaphylaxis.  The benefits include, but are not limited, to evaluation for allergens causing symptoms.  After answering the patient's questions they have agreed to proceed with skin testing.       ASSESSMENT/PLAN:  Bria Hamilton is a 24 year old female with rhinoconjunctivitis and environmental allergies.  Rhinoconjunctivitis   environmental allergies -dust mites, grass pollen, and weed pollen/lambs quarter.    Horse allergen was also tested today, as patient is around these animals approximately once per month.  Negative allergy testing.  Azelastine (Astepro ) nasal spray, 1-2 sprays in each nostril twice daily as needed.  This medication may be better taste, please consider brushing your teeth after using this nasal spray.     If you feel like azelastine nasal spray is not adequately controlling your nasal symptoms, start intranasal fluticasone (Flonase) 1-2 sprays in each nostril once daily.  We discussed potential risk of nasal septum breakdown, with long-term use of nasal steroid sprays.    Cetirizine (Zyrtec) 10 mg, levocetirizine (Xyzal) 5 mg, or fexofenadine (Allegra) 180 mg, patient takes twice daily antihistamines.    Pataday (olapatadine) 1 drop/eye daily as needed.  Patient reports she particularly dislikes eyedrops.    Patient was given instructions for nasal saline irrigation, 1-2 times daily as needed.    If nasal saline irrigation is not tolerated, nasal saline sprays could be  beneficial to remove allergens from the mucous membrane.    Patient was given instructions for environmental modifications techniques.    Patient is a potential candidate for allergy immunotherapy.  Patient reports she is planning on living in this area for the next approximately 3-5 years.  She travels frequently for work, she was given clinic locations/hours to select the appropriate clinic that works with her schedule.  Patient will schedule allergy shot consult appointment if she would like to pursue this treatment option.    Follow-up as needed.      Thank you for allowing me to participate in the care of Bria Hamilton.      I spent 46 minutes on the date of the encounter doing chart review, history and exam, documentation and further coordination as noted above exclusive of separately reported interpretations.     This excludes time spent for skin testing interpretation.     Please note that this note consists of symbols derived from keyboarding, dictation and/or voice recognition software. As a result, there may be errors in the script that have gone undetected. Please consider this when interpreting information found in this chart.     Leticia Lilly PA-C  [facility]      Again, thank you for allowing me to participate in the care of your patient.        Sincerely,        Leticia Lilly PA-C

## 2024-10-14 NOTE — PROGRESS NOTES
Bria Hamilton was seen in the Allergy Clinic at [facility].    Bria Hamilton is a 24 year old female  being seen today for ongoing evaluation of Seasonal allergic rhinitis.  Referral from,    Yazmin Matos MD [facility]     Hi,      I was wondering if I could get a referral to an allergist? My allergies have been pretty bad in the last few years and I would like to get tested. I did get tested when I was 9 and was found to be allergic to grass seed and one type of dust mites. The last few years my allergies have gotten so bad I have to take 1-2 pills of 10 mg of allergy meds. This year I have had to take 2 pills most days (one in morning and another at night). There have been a few days this year that my allergies have caused my eyes to hurt and weep so much that it is hard to see out of them. Let me know if you can put in the referral or if I need to schedule an appointment first.        History of Present Illness:     Rhinoconjunctivitis    Patient reports she has lived in a variety environments throughout the [place].  She reports she returned to the area approximately 1 year ago, and had significant allergy symptoms.  She reports in her childhood she lived in the [facility] for approximately 9 years.    Patient reports she has had to take twice daily antihistamines, and used eyedrops for her significant allergy symptoms.  She reports she typically has crusting, and needs to use a warm compress to remove the gunk that is present in her eyes secondary to her rhinoconjunctivitis.    Patient reports she was allergy tested at age 9, found grass pollen and dust mite allergy.  The onset of symptoms: Present since childhood  Perennial/seasonally-exacerbated (Spring and Perennial) chronic nasal symptoms (itch, clear rhinorrhea, stuffiness, and sneezing), postnasal drip and ocular symptoms (itching, redness, watering, discharge  and burning).  Nasal sprays fluticasone have been used, and they were partially effective. Oral antihistamines loratadine and cetirizine, twice daily have been used. They were partially effective.   There is no history of PE tubes, sinus surgeries, or tonsillectomy/adenoidectomy.   No problems with her sense of smell.  Patient denies family history of nasal polyposis.  Patient endorses that her father also has a significant grass allergy.    [facility] New Allergy [facility] Asthma    Question 10/9/2024 10:48 PM CDT - Filed by Patient   Reason for visit: Nasal or sinus symptoms    Eye symptoms   Nasal/ Sinus/ Eye Symptoms    When did your symptoms begin? On and off thru spring and summer.   What symptoms do you have? Itchy eyes    Watery eyes    Sneezing    Runny nose    Stuffy nose    Drainage down the throat    Ear pressure    Poor sense of smell   Any prior sinus surgeries? No   History of nasal polyps? No   History of sinus infections? No   Have you tried pills/oral medications? Yes   Which ones? Generic for zrytec and claritin   Have you tried nasal sprays? Yes   Which ones? Fluticasone propionate   Have you used eye drops? Yes   Which ones? Opcon A   When do symptoms occur? Year-round    Indoors    Outdoors   What makes symptoms worse? Dust    Mowing the lawn    Raking leaves    Temperature changes    Scents/perfumes   Environmental and Social History    Place of Residence: Apartment   Do you have Central Air Conditioning? 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 No   Does anyone living in your home smoke or vape? No   Family History    Do your parents, siblings, or children have asthma? Yes   Who? Mom, brother and sister have asthma   Do your parents, siblings, or children have environmental  allergies? Yes   Who? Dad had severe grass allergies. Brother has dustmite allergies   Do your parents, siblings, or children have food allergies? Yes   Who? Dad has slight milk allergy   Do your parents, siblings, or children have eczema? No       Past Medical History:   Diagnosis Date    ADHD (attention deficit hyperactivity disorder)     Angular cheilitis     Follows with dermatology    Congenital anteversion of both femurs     TERESITA (generalized anxiety disorder)     Prognathism        No past surgical history on file.      Current Outpatient Medications:     amphetamine-dextroamphetamine (ADDERALL XR) 10 MG 24 hr capsule, Take 10 mg by mouth daily, Disp: , Rfl:     escitalopram (LEXAPRO) 10 MG tablet, 15 mg daily 20 mg daily  when on menstrual cycle, Disp: , Rfl:     ketoconazole (NIZORAL) 2 % external cream, Apply topically daily, Disp: 60 g, Rfl: 11    naproxen (NAPROSYN) 500 MG tablet, as needed., Disp: , Rfl:   No Known Allergies      Family History   Problem Relation Age of Onset    Glomerulonephritis Father         C3GN    Skin Cancer No family hx of        EXAM:   There were no vitals taken for this visit.    Physical Exam  Constitutional:       General: She is not in acute distress.     Appearance: Normal appearance. She is not ill-appearing.   HENT:      Nose: Congestion and rhinorrhea present. No nasal deformity or septal deviation.      Right Turbinates: Not enlarged, swollen or pale.      Left Turbinates: Not enlarged, swollen or pale.      Comments: Moderate nasal turbinate enlargement     Mouth/Throat:      Mouth: Mucous membranes are moist.      Pharynx: Oropharynx is clear. Uvula midline. No posterior oropharyngeal erythema.      Tonsils: 0 on the right. 0 on the left.   Eyes:      General: No allergic shiner.        Right eye: No discharge.         Left eye: No discharge.      Conjunctiva/sclera: Conjunctivae normal.   Cardiovascular:      Rate and Rhythm: Normal rate and regular rhythm.       Heart sounds: Normal heart sounds, S1 normal and S2 normal.   Pulmonary:      Effort: Pulmonary effort is normal. No prolonged expiration.      Breath sounds: Normal breath sounds. No decreased air movement. No wheezing.   Skin:     Findings: Erythema: .krtes.   Neurological:      Mental Status: She is alert.   Psychiatric:         Mood and Affect: Mood normal.         Behavior: Behavior normal.         Judgment: Judgment normal.         WORKUP: Skin testing, appropriate positive controls       Percutaneous    Environmental Testing     Ordering Provider :  Leticia Lilly PA-C    Testing Technician : MN    Date: 10/14/2024      Time: 3:33 PM       (W/F in millimeters)    Allergen      Concentration : Manufacture     Terrance, white  1:20 H   0/0   Birch mix 1:20 H 0/0   [place], common 1:20 H 0/0   4. Elm, American 1:20 H 0/0   5. [place], Shagbark 1:20 H 0/0   6. Maple, Hard/Sugar 1:20 H 0/0   7. [place] Mix 1:20 H 0/0   8. Oak, Red 1:20 H 0/0   9. [place], American 1:20 H 0/0   10. [place] Tree 1:20 H 0/0   11. Dp Mite 30,000 A U /ML H 15/45   12. Df Mite 30,000 A U /ML H 13/45   13. Grass Mix # 4 10,000 B A U /ML H 10/35   14. Myles grass 1:20 H 13/45   15.Cockroach mix 1:10 H 0/0   16. Alternaria Tenuis 1:10 H 0/0   17. Aspergillus Fumigatus 1:10 H 0/0   18. Homodendrum cladosporioides 1:10 H 0/0   19. Penicillium notatum 1:10 H 0/0   20. Epicoccum 1:10 H 0/0   21. Ragweed, Short 1:20 H 0/0   22. Dock, Sorrel 1:20 H 0/0   23. Lamb's Quarters 1:20 H 5/20   24. Pigweed, Rough Redroot 1:20 H 0/0   25. Plantain, English 1:20 H 0/0   26. Sagebrush, Mugwort 1:20 H 0/0   27. Cat 10,000  B A U /ML H 0/0   28. AP Dog 1:100 H 0/0   29. Neg. Control: 50% glycerine/saline H 0/0   30. Pos. Control: histamine 6mg/ML 7/15   31.  Horse  0/0     At today's visit, the patient and I engaged in an informed consent discussion about allergy testing.  We discussed skin testing, blood testing, and the alternative of not undergoing any  testing. The patient has a preference for skin testing. We then discussed the risks and benefits of skin testing.  The patient understands skin testing risks can include, but are not limited to, urticaria, angioedema, shortness of breath, and severe anaphylaxis.  The benefits include, but are not limited, to evaluation for allergens causing symptoms.  After answering the patient's questions they have agreed to proceed with skin testing.       ASSESSMENT/PLAN:  Bria Hamilton is a 24 year old female with rhinoconjunctivitis and environmental allergies.  Rhinoconjunctivitis   environmental allergies -dust mites, grass pollen, and weed pollen/lambs quarter.    Horse allergen was also tested today, as patient is around these animals approximately once per month.  Negative allergy testing.  Azelastine (Astepro ) nasal spray, 1-2 sprays in each nostril twice daily as needed.  This medication may be better taste, please consider brushing your teeth after using this nasal spray.     If you feel like azelastine nasal spray is not adequately controlling your nasal symptoms, start intranasal fluticasone (Flonase) 1-2 sprays in each nostril once daily.  We discussed potential risk of nasal septum breakdown, with long-term use of nasal steroid sprays.    Cetirizine (Zyrtec) 10 mg, levocetirizine (Xyzal) 5 mg, or fexofenadine (Allegra) 180 mg, patient takes twice daily antihistamines.    Pataday (olapatadine) 1 drop/eye daily as needed.  Patient reports she particularly dislikes eyedrops.    Patient was given instructions for nasal saline irrigation, 1-2 times daily as needed.    If nasal saline irrigation is not tolerated, nasal saline sprays could be beneficial to remove allergens from the mucous membrane.    Patient was given instructions for environmental modifications techniques.    Patient is a potential candidate for allergy immunotherapy.  Patient reports she is planning on living in this area for the next  approximately 3-5 years.  She travels frequently for work, she was given clinic locations/hours to select the appropriate clinic that works with her schedule.  Patient will schedule allergy shot consult appointment if she would like to pursue this treatment option.    Follow-up as needed.      Thank you for allowing me to participate in the care of Bria Hamilton.      I spent 46 minutes on the date of the encounter doing chart review, history and exam, documentation and further coordination as noted above exclusive of separately reported interpretations.     This excludes time spent for skin testing interpretation.     Please note that this note consists of symbols derived from keyboarding, dictation and/or voice recognition software. As a result, there may be errors in the script that have gone undetected. Please consider this when interpreting information found in this chart.     Leticia Lilly PA-C  [facility]

## 2024-10-14 NOTE — PATIENT INSTRUCTIONS
Azelastine (Astepro ) nasal spray, 1-2 sprays in each nostril twice daily as needed.  This medication may be bitter taste, please consider brushing your teeth after using this nasal spray.     If you feel like azelastine nasal spray is not adequately controlling your nasal symptoms, start intranasal fluticasone (Flonase) 1-2 sprays in each nostril once daily.     Cetirizine (Zyrtec) 10 mg, levocetirizine (Xyzal) 5 mg, or fexofenadine (Allegra) 180 mg -you may take twice daily if needed    Pataday (olapatadine) 1 drop/eye daily as needed.  Keep this medication in the refrigerator for comfort of your eyes.  If you wear contacts, please wait at least 10 minutes before placing the contacts into your eye.     NASAL SALINE IRRIGATION INSTRUCTIONS     You will be starting nasal saline irrigations and will need to obtain the following:       - NeilMed Sinus Rinse 8 oz Kit (or prefer device)    - Distilled or filtered water   - Normal saline salt packets     Place filtered or distilled water into the NeilMed bottle up to the fill line (DO NOT USE TAP OR WELL WATER).  Place the pre-made salt packet in the 8 oz of saline.  Shake the bottle to suspend into solution.  Lean head forward over a sink or a basin.  Rinse each side of the nose with one-half of the bottle (each squeeze is about one-half of the bottle). Rinse the nose daily.      If you use topical nasal sprays, apply following irrigation.     Video example: https://www.youtube.com/watch?v=CI1loVy5Xd5      SINUS SALINE RINSE RECIPE    This can be completed 1-2 times daily, or as needed    Ingredients  1.  Pickling or yulisa salt-containing no iodide, anti-caking agents or preservatives. Do not use other salt such as table salt as these can be irritating to the nasal lining  2.  Baking soda  3.  8 ounces (1 cup) of lukewarm distilled or boiled water    In a clean container, mix 3 teaspoons of iodide-free salt with 1 teaspoon of baking soda and store in a small airtight  container. Add 1 teaspoon of the mixture to 8 ounces (1 cup) of lukewarm distilled or boiled water. Use less dry ingredients to make a weaker solution if burning or stinging is experienced.    Notes - if you are boiling tap water, cool water prior to use to prevent injury.   - do not use tap water unless it has been sterilized by boiling water prior to use.       If nasal saline irrigation is not tolerated, nasal saline sprays could be beneficial to remove allergens from the mucous membrane.    AEROALLERGEN AVOIDANCE INSTRUCTIONS  POLLEN  Pollens are the tiny airborne particles given off by trees, weeds, and grasses. They can be the cause of seasonal allergic rhinitis or hay fever symptoms, which include stuffy, itchy, runny nose, redness, swelling and itching of the eyes, and itching of the ears and throat. Here are some tips on how to avoid pollen exposure.  Keep windows closed and use the air conditioner when possible.   Avoid outside exposure in the early morning as pollen counts are highest at that time.   Take a shower and wash hair each night.   Consider wearing a mask when working in the yard and/or garden.   Clean furnace filter monthly with HEPA filters. Consider a HEPA filter vacuum  which will prevent pollen from being reintroduced into the air.   DUST MITES  Dust mites can never be entirely eliminated in the house no matter how clean your house is. Dust mites are attracted to warm, moist areas and feed on dead skin flakes. Here are tips to minimize dust mites in your home.   Encase pillows and mattress/box springs in zippered allergy covers.   Wash bedding in hot water (at least 130 F) every 7-14 days.   Avoid curtains, carpet, and upholstered furniture if possible.   Use HEPA air filters and a HEPA filter vacuum . Change filters monthly. Vacuum weekly.   Keep bedroom simple, avoiding clutter, so it can quickly be dusted.   Cover heating vents with vent filters.   Keep stuffed toys in a  "closed container and wash or freeze regularly.   Keep clothing in the closet with the door closed.      Common seasonal allergens:  Grass pollens ? Late spring to early summer  Weed pollens ? Early summer through fall  Common perennial (year?round) allergens:  Dust mites ? a very common indoor allergen that causes significant allergy in most of the [place]; highest allergen levels are in the bed    IMMUNOTHERAPY:    Background: Immunotherapy (allergy shots) is a long-term approach to decrease allergic sensitivity. It is the only therapy that has the possibility of making an individual less allergic to the items for which they will be treated. It does not provide immediate symptom relief. It takes time for the body to adjust to the new (immunologic) challenges to which we are exposing it. Patients frequently need to continue to use medications along with immunotherapy to control their allergy symptoms, at least through the building process.    Goals: Over the 3-5 year course of the shots, we aim to be able to minimize medications and to eventually develop a long-term tolerance to the injected proteins that will allow us to stop the injections and have the patient maintain control of their symptoms for years or decades without getting them any longer.    The Injections are Individualized: Immunotherapy involves administering a customized preparation based on your allergy test results. You are only treated with items to which you are sensitive.    Time Course: Improved symptom control is generally first noticed after 6-12 months on the allergy injections; this occurs in ~70-90% of individuals. The majority of patients are able to stop the injections after 3-5 years and are able to maintain long term benefit.    Frequency of Injections: During the first 6-8 months, we recommend that you come weekly while we increase the dose or concentration in a stepwise manner. You may get shots during this \"building phase\" " "every 3-14 days. Once you have reached the \"maintainence dose\" we will extend out the time in between the injections. Eventually, the time interval may gradually extended out to every 4 weeks for the inhaled allergens.    Number of Injections/Serums: You will receive anywhere from 1 to 4 injections per visit, depending on your sensitivities and the number of allergens we can include in each serum to maintain an appropriate therapeutic dose.    Preventing Reactions: Taking an antihistamine such as Levocetirizine (Xyzal), Cetirizine (Zyrtec) or Fexofenadine (Allergra) 30 minutes prior to each allergy shot injection can decrease the local shot reactions and likely also minimize the risk for systemic reactions as well. This \"Pre-Treatment\" for the allergy shots is recommended for all people starting Immunotherapy injections.    Timing of Shot Reactions & the Waiting Period: If a severe reaction were to occur it is most likely to occur fairly quickly after an allergy shot. Over 90% of these reactions will occur within the first 30 minutes after the injection. This is the rationale for having you wait in clinic for 30 minutes after an allergy shot. You may also be required to have a prescription for an epinephrine auto-injector which you will need to bring with you to the clinic for each injection visit.    Notify Us of Any Shot Reactions: If you have any new symptoms or \"feel different\" after your allergy shot, it is crucial that you notify the nurse of your symptoms immediately. This will allow us to determine how to proceed with your treatment in the safest manner possible.    Local Shot Reactions: These injections frequently cause redness, warmth, swelling or itching at the injection site as we are injecting into your skin the things to which you are allergic. If this reaction is transient (lasting less than 24 hours) no dosage adjustment is necessary. If it lasts more than 24 hours, or is very large, your building " schedule may be modified to decrease the risk of a systemic reaction.    Treatment of Local Reactions: Local reactions are a common event with the allergy shots, and there are a few things that you can do to help prevent and treat these reactions. After you get the injection immediately. 1) Apply ice/cold packs. Let the shot nurse know that you would like a cold pack and they will try to arrange this for you. 2) Apply a steroid Cream or Ointment to the site of the shot to help prevent inflammation or treat it if it has already developed.     Systemic Shot Reactions: Reactions to immunotherapy may not be limited to the injection site and may include nose or eyes symptoms consistent with increased allergy symptoms, a tickle in the throat, throat discomfort, difficulty speaking or swallowing, coughing, wheezing, asthma attack, nausea, vomiting, cramping, severe abdominal pain, or uterine cramps in women. Additional risks include laryngeal spasm and edema (airway narrowing), shock and decreased heart function. These severe and multi-system reactions are called anaphylaxis and are medical emergency.     Treatment of Systemic Reactions: Once our staff is aware of any symptoms that may have developed, you will be evaluated and treatment will be instituted as necessary. This may include antihistamines, subcutaneous or intramuscular injection of epinephrine, as well as inhaled or nebulized therapy. These treatments are not optional. Overwhelming evidence has shown that minimal treatment or delay in initiating treatment increases the risk of death from anaphylaxis. Due to the risk that our clinic incurs by administering allergy shots, if treatments for a reaction are absolutely refused or if you do not stay for the FULL 30 MINUTE waiting period, the option of receiving the allergy shots is forfeited, and you will not be able to receive any further injections (further notification, aside from this statement, may not be  given).    Beta-Blockers & Immunotherapy: If another physician has prescribed or wants to start a medication for your heart/blood pressure called a beta-blocker (i.e. atenolol, metoprolol, carvedilol, etc.) this may need to be changed or stopped while you are on Immunotherapy as it can interfere with treating severe reactions. It is your responsibility to discuss this with the physician who ordered the medication prior to notifying us that you would like to start the injections. If we discover that you are receiving Immunotherapy injections and taking a Beta-Blocker, we will hold off on administering any further shots until you have discussed this with the prescribing physician and your allergist.    Yearly Billing & Cost: Your entire series of shots for the next 6-12 months are generated when the serums are prepared. This is generally a significant cost. Once the serums are generated they cannot be returned. Therefore, we recommend that every patient check with their health insurance company to find out what your out-of-pocket expenses would be. If your insurance plan has a deductible, you may be responsible for the entire cost out of pocket until your deductible is met. Your insurance company will have a patient  that is available to discuss your specific situation and we encourage you to utilize them if you have any questions or concerns. When your serums are due to be refilled you will again be billed for 6-12 months of treatment at once.    Cluster Protocols: Some individuals may want to try to get through the build up period faster. This may be able to be achieved using a Cluster Protocol, where multiple injections are given on one day. The waiting period is still required after each injection, so a longer period is spent during these injection visits (plan on ~2-3 hours each day). There is also a greater risk for local reactions with this process. You can discuss with your allergist  whether this protocol is appropriate for you, and if you would like to try it, include this in the message you leave for the shot nurse when notifying us that you want to start the injections.    Starting Allergy Shots: As there are many decisions and moving parts that go into starting Immunotherapy injections, as a rule we do not start the shots after the first visit. We want you to be comfortable committing to this, as there is a large time commitment involved, and erratic adherence to the shot schedule will only end with disappointing results. If you have decided to go ahead with the Immunotherapy injections, send a message through [employer] or call the clinic to schedule an immunotherapy consult appointment. You will need to sign a consent form in the clinic before we can proceed with treatment.     We will need you to let us know 2 things:  1. That you called your insurance company to review your insurance coverage and agree to any out of pocket costs that may arise from the treatment  2. If you want to do the Cluster Protocol or the Standard protocol      These are the billing and diagnosis codes that your insurance company may need to help you determine if allergy shots are covered and what potential out of pocked costs you may have. You may also want to contact the [facility] Business [facility]/Cost of Care Estimate Line at [phone] for more information.      Traditional Immunotherapy = 34791   - Billed each visit to allergy shot clinic    Cluster/Rapid Immunotherapy = 18916  - Billed hourly at each visit, number of units billed depends on the length of your visit. (cluster build is typically a minimum of 10 total units over multiple visits)    Allergy Shot Serum: 40106 - the amount of serum in total varies depending on how many allergy shots you will need. At the start of treatment, each injection is billed for 30 units. Treatment consists of a minimum of 1 injection up to a maximum of 4 injections depending  on how many allergens are included in the treatment.  (1 injection/serum = 30 units) (2 injections/serums = 60 units) (3 injections/serums = 90 units) (4 injections/serums = 120 units)    Potential Diagnosis Codes:    Allergic Rhinitis Due to Dust Mite or Mold - J30.89  Allergic Rhinitis Due to Animals - J30.81  Seasonal Allergic Rhinitis Due to Pollens - J30.1  Allergic Conjunctivitis - H10.13    [facility], [address] [place] [address] [address], [place], [place], [address]  Shot Clinic Hours  Monday, Tuesday, and Friday 7:20 am- 3:40 pm (Hours maybe expanding)     [facility]   [address] [place] [address]  Shot Clinic Hours  Tuesday and Wednesday 7 am - 4:20 pm    [facility]  [address] [place] [address]  Shot Clinic Hours  Monday-Tuesday: 7 am - 4:20 pm, Thursday 7 am-3:20 pm    [facility]  [address] [place] [address] [address], [place] [address]  Shot Clinic Hours  Monday 7 am-5 pm , Tuesday 7 am -12 pm, Friday 7 am -4:30 pm    [facility]  [address] [place] [id number]  Shot Clinic Hours  Tuesday 1 pm -5 pm ,Thursday 8 am -5 pm     [address] [address]  Shot Clinic Hours  Tuesday & Wednesday 7 am -5:10 pm, Thursday 12 pm -4:10 pm, Friday  7 am -11 am      Allergy shot consult appt

## 2024-10-15 ENCOUNTER — THERAPY VISIT (OUTPATIENT)
Dept: OCCUPATIONAL THERAPY | Facility: CLINIC | Age: 24
End: 2024-10-15
Payer: COMMERCIAL

## 2024-10-15 DIAGNOSIS — M24.80 GENERALIZED HYPERMOBILITY OF JOINTS: Primary | ICD-10-CM

## 2024-10-15 PROCEDURE — 97140 MANUAL THERAPY 1/> REGIONS: CPT | Mod: GO | Performed by: OCCUPATIONAL THERAPIST

## 2024-10-15 PROCEDURE — 97112 NEUROMUSCULAR REEDUCATION: CPT | Mod: GO | Performed by: OCCUPATIONAL THERAPIST

## 2024-11-04 ENCOUNTER — THERAPY VISIT (OUTPATIENT)
Dept: PHYSICAL THERAPY | Facility: CLINIC | Age: 24
End: 2024-11-04
Payer: COMMERCIAL

## 2024-11-04 ENCOUNTER — THERAPY VISIT (OUTPATIENT)
Dept: OCCUPATIONAL THERAPY | Facility: CLINIC | Age: 24
End: 2024-11-04
Payer: COMMERCIAL

## 2024-11-04 DIAGNOSIS — R10.2 PELVIC PAIN IN FEMALE: Primary | ICD-10-CM

## 2024-11-04 DIAGNOSIS — M24.80 GENERALIZED HYPERMOBILITY OF JOINTS: Primary | ICD-10-CM

## 2024-11-04 PROCEDURE — 97112 NEUROMUSCULAR REEDUCATION: CPT | Mod: GO | Performed by: OCCUPATIONAL THERAPIST

## 2024-11-04 PROCEDURE — 97140 MANUAL THERAPY 1/> REGIONS: CPT | Mod: GP | Performed by: PHYSICAL THERAPIST

## 2024-11-04 PROCEDURE — 97110 THERAPEUTIC EXERCISES: CPT | Mod: GO | Performed by: OCCUPATIONAL THERAPIST

## 2024-11-04 PROCEDURE — 97140 MANUAL THERAPY 1/> REGIONS: CPT | Mod: GO | Performed by: OCCUPATIONAL THERAPIST

## 2024-12-02 ENCOUNTER — THERAPY VISIT (OUTPATIENT)
Dept: OCCUPATIONAL THERAPY | Facility: CLINIC | Age: 24
End: 2024-12-02
Payer: COMMERCIAL

## 2024-12-02 ENCOUNTER — OFFICE VISIT (OUTPATIENT)
Dept: DERMATOLOGY | Facility: CLINIC | Age: 24
End: 2024-12-02
Attending: DERMATOLOGY
Payer: COMMERCIAL

## 2024-12-02 ENCOUNTER — THERAPY VISIT (OUTPATIENT)
Dept: PHYSICAL THERAPY | Facility: CLINIC | Age: 24
End: 2024-12-02
Payer: COMMERCIAL

## 2024-12-02 DIAGNOSIS — R10.2 PELVIC PAIN IN FEMALE: Primary | ICD-10-CM

## 2024-12-02 DIAGNOSIS — L21.9 SEBORRHEIC DERMATITIS: ICD-10-CM

## 2024-12-02 DIAGNOSIS — M25.531 WRIST PAIN, ACUTE, RIGHT: ICD-10-CM

## 2024-12-02 DIAGNOSIS — K13.0 ANGULAR CHEILITIS: Primary | ICD-10-CM

## 2024-12-02 DIAGNOSIS — M24.80 GENERALIZED HYPERMOBILITY OF JOINTS: Primary | ICD-10-CM

## 2024-12-02 PROCEDURE — 97140 MANUAL THERAPY 1/> REGIONS: CPT | Mod: GO | Performed by: OCCUPATIONAL THERAPIST

## 2024-12-02 PROCEDURE — 99214 OFFICE O/P EST MOD 30 MIN: CPT | Mod: GC | Performed by: DERMATOLOGY

## 2024-12-02 PROCEDURE — 97112 NEUROMUSCULAR REEDUCATION: CPT | Mod: GO | Performed by: OCCUPATIONAL THERAPIST

## 2024-12-02 PROCEDURE — 97140 MANUAL THERAPY 1/> REGIONS: CPT | Mod: GP | Performed by: PHYSICAL THERAPIST

## 2024-12-02 PROCEDURE — 97760 ORTHOTIC MGMT&TRAING 1ST ENC: CPT | Mod: GO | Performed by: OCCUPATIONAL THERAPIST

## 2024-12-02 RX ORDER — MUPIROCIN 20 MG/G
OINTMENT TOPICAL
Qty: 15 G | Refills: 1 | Status: SHIPPED | OUTPATIENT
Start: 2024-12-02

## 2024-12-02 RX ORDER — MUPIROCIN 20 MG/G
OINTMENT TOPICAL
Qty: 15 G | Refills: 1 | Status: SHIPPED | OUTPATIENT
Start: 2024-12-02 | End: 2024-12-02

## 2024-12-02 ASSESSMENT — PAIN SCALES - GENERAL: PAINLEVEL_OUTOF10: NO PAIN (0)

## 2024-12-02 NOTE — LETTER
12/2/2024       RE: Bria Hamilton  [address]  [facility] [address]     Dear Colleague,    Thank you for referring your patient, Bria Hamilton, to the [facility] [place] [facility]. Please see a copy of my visit note below.    [facility] Dermatology Note  Encounter Date: Dec 2, 2024  Office Visit     Dermatology Problem List:  1. Seborrheic dermatitis  - Current: ketoconazole 2% cream, Head&Shoulders  - Previous: hydrocortisone  2. Angular chelitis  - Current: mupirocin ointment bid x 6 weeks (apply directly to skin)  - Previous: hydrocortisone, ketoconazole 2% cream    ____________________________________________    Assessment & Plan:     # Seborrheic dermatitis, chronic and at-goal  Had excellent improvement in scalp component w use of head and shoulder shampoo. Seb derm involving eyebrows persists, but facial component is asymptomatic and so she is not actively treating. Overall, at goal. Continue current regimen.   - Patient education provided along with a handout.  - ketoconazole 2% bid prn application to rash areas.  - Head&Shoulders shampoo as face wash at least twice a week as tolerated.  - Moisturize: Continue using Zoey-cream that previous dermatologist recommended. Advised best to apply after bathing to lock in moisture.  - Monitor: Patient to continue monitoring at home and will contact the clinic for any changes.  - Sun protection: Counseled SPF30+ sunscreen, UPF clothing, sun avoidance, tanning bed avoidance.    # Angular chelitis, chronic, acute flare, not-at-goal.  No sustained improvement in cheilitis with ketoconazole cream. Uses vaseline frequently for chapstick. She had initial improvement with ketoconazole but then fissures returned despite continued use. Wonder about bacterial component. Discussed utility of adding nystatin, however unlikely to have marked benefit given failure  to improve w ketoconazole. Will trial topical mupirocin. Would except fissures to heal while using, although may recur following discontinuation.   - begin mupirocin bid until healed  - send my chart message in 6 weeks  - send Analiza message to Dr. Alex if no improvement. At that time, would begin tacrolimus ointment vs hydrocortisone ointment (tried and failed in the past)  - follow-up in 4 months    Procedures Performed:   None    Follow-up: 4 month(s) in-person, or earlier for new or changing lesions    Staff and Medical Student:     Leona MCCLOUD PGY3, saw and staffed this patient with the attending. All recommendations, therapies and procedures were pre-staffed with the attending or administered with direct supervision.    IDee MD, saw this patient with the resident and agree with the resident s findings and plan of care as documented in the resident s note.    ____________________________________________    CC: No chief complaint on file.    HPI:  Ms. Bria Hamilton is a(n) 24 year old female who presents today as a return patient for SD.    Angular cheilitis no better. Initially improved w keto cream but then fissures returned. Tried hydrocortisone in the past but it didn't seem to help.    SD much better w head and shoulders.      Labs Reviewed:  N/A    Physical Exam:  Vitals: There were no vitals taken for this visit.  SKIN: Focused examination of face was performed.  - xerotic ulcerated oral commisures bilaterally  - b/l eyebrows w faint pink erythema and scalinh  - No other lesions of concern on areas examined.     Medications:  Current Outpatient Medications   Medication Sig Dispense Refill     amphetamine-dextroamphetamine (ADDERALL XR) 10 MG 24 hr capsule Take 10 mg by mouth daily       escitalopram (LEXAPRO) 10 MG tablet        escitalopram (LEXAPRO) 10 MG tablet 15 mg daily 20 mg daily  when on menstrual cycle       ketoconazole (NIZORAL) 2 % external cream Apply topically  daily 60 g 11     naproxen (NAPROSYN) 500 MG tablet as needed.       No current facility-administered medications for this visit.      Past Medical History:   Patient Active Problem List   Diagnosis     Prognathism     Angular cheilitis     TERESITA (generalized anxiety disorder)     Congenital anteversion of both femurs     Pelvic pain in female     Multiple joint pain     Generalized hypermobility of joints     Past Medical History:   Diagnosis Date     ADHD (attention deficit hyperactivity disorder)      Angular cheilitis     Follows with dermatology     Congenital anteversion of both femurs      TERESITA (generalized anxiety disorder)      Prognathism        CC Chapincito Brewster MD  [address] [place], [place] [id number] on close of this encounter.      Again, thank you for allowing me to participate in the care of your patient.      Sincerely,    Dee Alex MD

## 2024-12-02 NOTE — PATIENT INSTRUCTIONS
Mupirocin ointment twice a day for 6 weeks or until healed. Apply directly to skin. Continue liberal use of vaseline to lips.  If things are not better by 6 weeks, please send us a [employer] message. At that time we will try an anti-inflammatory.     Continue using ketoconazole cream to red areas on the eyebrows/around the nose.    Continue head and shoulders shampoo to scalp.    Call us if any questions!

## 2024-12-02 NOTE — PROGRESS NOTES
[facility] Dermatology Note  Encounter Date: Dec 2, 2024  Office Visit     Dermatology Problem List:  1. Seborrheic dermatitis  - Current: ketoconazole 2% cream, Head&Shoulders  - Previous: hydrocortisone  2. Angular chelitis  - Current: mupirocin ointment bid x 6 weeks (apply directly to skin)  - Previous: hydrocortisone, ketoconazole 2% cream    ____________________________________________    Assessment & Plan:     # Seborrheic dermatitis, chronic and at-goal  Had excellent improvement in scalp component w use of head and shoulder shampoo. Seb derm involving eyebrows persists, but facial component is asymptomatic and so she is not actively treating. Overall, at goal. Continue current regimen.   - Patient education provided along with a handout.  - ketoconazole 2% bid prn application to rash areas.  - Head&Shoulders shampoo as face wash at least twice a week as tolerated.  - Moisturize: Continue using Zoey-cream that previous dermatologist recommended. Advised best to apply after bathing to lock in moisture.  - Monitor: Patient to continue monitoring at home and will contact the clinic for any changes.  - Sun protection: Counseled SPF30+ sunscreen, UPF clothing, sun avoidance, tanning bed avoidance.    # Angular chelitis, chronic, acute flare, not-at-goal.  No sustained improvement in cheilitis with ketoconazole cream. Uses vaseline frequently for chapstick. She had initial improvement with ketoconazole but then fissures returned despite continued use. Wonder about bacterial component. Discussed utility of adding nystatin, however unlikely to have marked benefit given failure to improve w ketoconazole. Will trial topical mupirocin. Would except fissures to heal while using, although may recur following discontinuation.   - begin mupirocin bid until healed  - send my chart message in 6 weeks  - send mycZenSuitet message to Dr. Alex if no improvement. At that time, would begin tacrolimus ointment  vs hydrocortisone ointment (tried and failed in the past)  - follow-up in 4 months    Procedures Performed:   None    Follow-up: 4 month(s) in-person, or earlier for new or changing lesions    Staff and Medical Student:     Leona MCCLOUD PGY3, saw and staffed this patient with the attending. All recommendations, therapies and procedures were pre-staffed with the attending or administered with direct supervision.    Dee MCCLOUD MD, saw this patient with the resident and agree with the resident s findings and plan of care as documented in the resident s note.    ____________________________________________    CC: No chief complaint on file.    HPI:  Ms. Bria Hamilton is a(n) 24 year old female who presents today as a return patient for SD.    Angular cheilitis no better. Initially improved w keto cream but then fissures returned. Tried hydrocortisone in the past but it didn't seem to help.    SD much better w head and shoulders.      Labs Reviewed:  N/A    Physical Exam:  Vitals: There were no vitals taken for this visit.  SKIN: Focused examination of face was performed.  - xerotic ulcerated oral commisures bilaterally  - b/l eyebrows w faint pink erythema and scalinh  - No other lesions of concern on areas examined.     Medications:  Current Outpatient Medications   Medication Sig Dispense Refill    amphetamine-dextroamphetamine (ADDERALL XR) 10 MG 24 hr capsule Take 10 mg by mouth daily      escitalopram (LEXAPRO) 10 MG tablet       escitalopram (LEXAPRO) 10 MG tablet 15 mg daily 20 mg daily  when on menstrual cycle      ketoconazole (NIZORAL) 2 % external cream Apply topically daily 60 g 11    naproxen (NAPROSYN) 500 MG tablet as needed.       No current facility-administered medications for this visit.      Past Medical History:   Patient Active Problem List   Diagnosis    Prognathism    Angular cheilitis    TERESITA (generalized anxiety disorder)    Congenital anteversion of both femurs    Pelvic pain  in female    Multiple joint pain    Generalized hypermobility of joints     Past Medical History:   Diagnosis Date    ADHD (attention deficit hyperactivity disorder)     Angular cheilitis     Follows with dermatology    Congenital anteversion of both femurs     TERESITA (generalized anxiety disorder)     Prognathism        CC Chapincito Brewster MD  [address] [place] [phone] on close of this encounter.

## 2024-12-02 NOTE — NURSING NOTE
Dermatology Rooming Note    Bria Hamilton's goals for this visit include:   Chief Complaint   Patient presents with    Derm Problem     Has not noticed much of an improvement. Narcisa.      Earle Fong, EMT  Clinic Support  [facility]     [phone]    Employed by [employer]

## 2024-12-03 NOTE — PROGRESS NOTES
12/02/24 0500   Appointment Info   Signing clinician's name / credentials Marcie Cespedes, PT, OCS   Total/Authorized Visits EPIC  03/22/24   Visits Used 12   Medical Diagnosis Pelvic pain in female (R10.2)   PT Tx Diagnosis vaginismus, dyspareunia   Other pertinent information VERY GENTLE, move slowly   Progress Note/Certification   Progress Note Due Date 12/07/24   Progress Note Completed Date 12/02/24   GOALS   PT Goals 2   PT Goal 1   Goal Identifier Painfree tampon use, intimacy, pelvic exams   Goal Description D1 or less   Rationale to maximize safety and independence within the home;to maximize safety and independence with performance of ADLs and functional tasks;to maximize safety and independence with self cares   Goal Progress current D2, the same but dilator size increasing 3/4   Target Date 01/28/25   Subjective Report   Subjective Report Overall doing well. No issues with bladder/bowel. Dilator use 3-4 overall. Mostly 3.  Yearly physical in February. No tampons yet.   Objective Measures   Objective Measures Objective Measure 1   Objective Measure 1   Objective Measure Improving pelvic floor tension, L>R LA/UGT   Details Initial digital penetration with index finger within 1-2 seconds   Treatment Interventions (PT)   Interventions Manual Therapy   Therapeutic Procedure/Exercise   Ther Proc 1 SLR abduction x 20 AG   Ther Proc 1 - Details bridge with band ER/IR x5 reps, 10 x   PTRx Ther Proc 1 Adductor Stretch   PTRx Ther Proc 1 - Details 30 sec x 3 B, also sit back, 1-2 x day   PTRx Ther Proc 2 Pretzel Stretch   PTRx Ther Proc 2 - Details 20 sec x 3-5 reps each leg, 1-2 x day   Skilled Intervention to improved relaxation of PF and strength hips/core   Patient Response/Progress good demonstration   Therapeutic Activity   Therapeutic Activities Ther Act 2;Ther Act 3   Ther Act 1 educated in POC and normal pelvic floor anatomy/function   Ther Act 1 - Details Begin dilator #1, 5-10 minutes, focus on  relaxation, 3 x week   Ther Act 2 seated pelvic floor relaxation position   Ther Act 2 - Details check selt throughout day for PF tension, avoid leg crossing.   Ther Act 3 Discussed gym at home: elliptical/tm or bike for cardio 30 min, 5 x week, rev'd all other upper and lower body equipment   PTRx Ther Act 1 Overactive Dysfunction and Suggestions to Reduce Overactive Dysfunction   PTRx Ther Act 1 - Details belly breathing 5-10 min daily   Skilled Intervention to decrease tone to pelvic floor.   Patient Response/Progress good verbal understanding   Manual Therapy   Manual Therapy: Mobilization, MFR, MLD, friction massage minutes (37010) 35   Manual Therapy 1 supine MFR to pelvic floor, start slowly at UGT/then to LA/OI   Skilled Intervention to decrease PF tone   Patient Response/Progress much improved tolerance to MFR.   Education   Learner/Method Patient;No Barriers to Learning   Plan   Home program PTRX HEP   Plan for next session continue with MFR and dilator work at home. F/u every ~8 weeks   Comments   Pelvic Health Informed Consent Statement Discussed with patient/guardian reason for referral regarding pelvic health needs and external/internal pelvic floor muscle examination.  Opportunity provided to ask questions and verbal consent for assessment and intervention was given.   Total Session Time   Timed Code Treatment Minutes 35   Total Treatment Time (sum of timed and untimed services) 35         PLAN  Continue therapy per current plan of care.    Beginning/End Dates of Progress Note Reporting Period:  12/02/24 to 12/02/2024    Referring Provider:  Calos Vieira

## 2024-12-16 ENCOUNTER — THERAPY VISIT (OUTPATIENT)
Dept: OCCUPATIONAL THERAPY | Facility: CLINIC | Age: 24
End: 2024-12-16
Payer: COMMERCIAL

## 2024-12-16 DIAGNOSIS — M24.80 GENERALIZED HYPERMOBILITY OF JOINTS: Primary | ICD-10-CM

## 2024-12-16 PROCEDURE — 97112 NEUROMUSCULAR REEDUCATION: CPT | Mod: GO | Performed by: OCCUPATIONAL THERAPIST

## 2024-12-16 PROCEDURE — 97140 MANUAL THERAPY 1/> REGIONS: CPT | Mod: GO | Performed by: OCCUPATIONAL THERAPIST

## 2024-12-30 ENCOUNTER — THERAPY VISIT (OUTPATIENT)
Dept: OCCUPATIONAL THERAPY | Facility: CLINIC | Age: 24
End: 2024-12-30
Payer: COMMERCIAL

## 2024-12-30 DIAGNOSIS — M24.80 GENERALIZED HYPERMOBILITY OF JOINTS: Primary | ICD-10-CM

## 2024-12-30 PROCEDURE — 97112 NEUROMUSCULAR REEDUCATION: CPT | Mod: GO | Performed by: OCCUPATIONAL THERAPIST

## 2024-12-30 PROCEDURE — 97140 MANUAL THERAPY 1/> REGIONS: CPT | Mod: GO | Performed by: OCCUPATIONAL THERAPIST

## 2025-01-13 PROBLEM — M24.80 GENERALIZED HYPERMOBILITY OF JOINTS: Status: RESOLVED | Noted: 2024-09-19 | Resolved: 2025-01-13

## 2025-01-19 ENCOUNTER — OFFICE VISIT (OUTPATIENT)
Dept: URGENT CARE | Facility: URGENT CARE | Age: 25
End: 2025-01-19
Payer: COMMERCIAL

## 2025-01-19 VITALS
SYSTOLIC BLOOD PRESSURE: 96 MMHG | DIASTOLIC BLOOD PRESSURE: 69 MMHG | HEART RATE: 100 BPM | BODY MASS INDEX: 24.21 KG/M2 | RESPIRATION RATE: 20 BRPM | WEIGHT: 157 LBS | OXYGEN SATURATION: 98 % | TEMPERATURE: 97.9 F

## 2025-01-19 DIAGNOSIS — H10.33 ACUTE BACTERIAL CONJUNCTIVITIS OF BOTH EYES: Primary | ICD-10-CM

## 2025-01-19 PROCEDURE — 99213 OFFICE O/P EST LOW 20 MIN: CPT | Performed by: NURSE PRACTITIONER

## 2025-01-19 RX ORDER — POLYMYXIN B SULFATE AND TRIMETHOPRIM 1; 10000 MG/ML; [USP'U]/ML
1-2 SOLUTION OPHTHALMIC EVERY 4 HOURS
Qty: 10 ML | Refills: 0 | Status: SHIPPED | OUTPATIENT
Start: 2025-01-19 | End: 2025-01-26

## 2025-01-19 NOTE — PROGRESS NOTES
Assessment & Plan     Acute bacterial conjunctivitis of both eyes  - polymixin b-trimethoprim (POLYTRIM) 28341-5.1 UNIT/ML-% ophthalmic solution  Dispense: 10 mL; Refill: 0     Patient Instructions   Polytrim every 4 hours during the day.    Lots of handwashing.    Follow up if persists or worsens.          Return in about 2 days (around 1/21/2025) for with regular provider if symptoms persist.    HIRO Zapata CNP  [facility] MISA Fraser is a 24 year old female who presents to clinic today for the following health issues:  Chief Complaint   Patient presents with    Eye Problem     Left eye watery with crusty discharge in am x 4.   Allergy medication not helping      HPI    Eye Problem    Onset of symptoms was 1 day(s) ago.   Location: both eyes - left worse.    Course of illness is same.    Severity moderate  Current and Associated symptoms: discharge, mattering, redness  Treatment measures tried include eye drops  Context: Pink eye exposure      Review of Systems  Constitutional, HEENT, cardiovascular, pulmonary, GI, , musculoskeletal, neuro, skin, endocrine and psych systems are negative, except as otherwise noted.      Objective    BP 96/69   Pulse 100   Temp 97.9  F (36.6  C) (Tympanic)   Resp 20   Wt 71.2 kg (157 lb)   SpO2 98%   BMI 24.21 kg/m    Physical Exam   GENERAL: alert and no distress  EYES: conjunctiva/corneas- conjunctival injection and yellow colored discharge present both  RESP: lungs clear to auscultation - no rales, rhonchi or wheezes  CV: regular rate and rhythm, normal S1 S2, no S3 or S4, no murmur, click or rub, no peripheral edema  MS: no gross musculoskeletal defects noted, no edema

## 2025-02-18 SDOH — HEALTH STABILITY: PHYSICAL HEALTH: ON AVERAGE, HOW MANY DAYS PER WEEK DO YOU ENGAGE IN MODERATE TO STRENUOUS EXERCISE (LIKE A BRISK WALK)?: 2 DAYS

## 2025-02-18 SDOH — HEALTH STABILITY: PHYSICAL HEALTH: ON AVERAGE, HOW MANY MINUTES DO YOU ENGAGE IN EXERCISE AT THIS LEVEL?: 30 MIN

## 2025-02-18 ASSESSMENT — PATIENT HEALTH QUESTIONNAIRE - PHQ9
SUM OF ALL RESPONSES TO PHQ QUESTIONS 1-9: 7
SUM OF ALL RESPONSES TO PHQ QUESTIONS 1-9: 7
10. IF YOU CHECKED OFF ANY PROBLEMS, HOW DIFFICULT HAVE THESE PROBLEMS MADE IT FOR YOU TO DO YOUR WORK, TAKE CARE OF THINGS AT HOME, OR GET ALONG WITH OTHER PEOPLE: VERY DIFFICULT

## 2025-02-18 ASSESSMENT — SOCIAL DETERMINANTS OF HEALTH (SDOH): HOW OFTEN DO YOU GET TOGETHER WITH FRIENDS OR RELATIVES?: TWICE A WEEK

## 2025-02-19 ENCOUNTER — OFFICE VISIT (OUTPATIENT)
Dept: FAMILY MEDICINE | Facility: CLINIC | Age: 25
End: 2025-02-19
Payer: COMMERCIAL

## 2025-02-19 VITALS
SYSTOLIC BLOOD PRESSURE: 110 MMHG | BODY MASS INDEX: 23.92 KG/M2 | TEMPERATURE: 98.4 F | OXYGEN SATURATION: 99 % | DIASTOLIC BLOOD PRESSURE: 72 MMHG | RESPIRATION RATE: 14 BRPM | HEIGHT: 67 IN | WEIGHT: 152.4 LBS | HEART RATE: 87 BPM

## 2025-02-19 DIAGNOSIS — Z00.00 ROUTINE GENERAL MEDICAL EXAMINATION AT A HEALTH CARE FACILITY: Primary | ICD-10-CM

## 2025-02-19 DIAGNOSIS — Z23 NEED FOR COVID-19 VACCINE: ICD-10-CM

## 2025-02-19 DIAGNOSIS — Z30.09 ENCOUNTER FOR OTHER GENERAL COUNSELING OR ADVICE ON CONTRACEPTION: ICD-10-CM

## 2025-02-19 DIAGNOSIS — M26.621 TMJ TENDERNESS, RIGHT: ICD-10-CM

## 2025-02-19 PROCEDURE — 99213 OFFICE O/P EST LOW 20 MIN: CPT | Mod: 25 | Performed by: PHYSICIAN ASSISTANT

## 2025-02-19 PROCEDURE — 90480 ADMN SARSCOV2 VAC 1/ONLY CMP: CPT | Performed by: PHYSICIAN ASSISTANT

## 2025-02-19 PROCEDURE — 91320 SARSCV2 VAC 30MCG TRS-SUC IM: CPT | Performed by: PHYSICIAN ASSISTANT

## 2025-02-19 PROCEDURE — 99395 PREV VISIT EST AGE 18-39: CPT | Mod: 25 | Performed by: PHYSICIAN ASSISTANT

## 2025-02-19 ASSESSMENT — PAIN SCALES - GENERAL: PAINLEVEL_OUTOF10: NO PAIN (0)

## 2025-02-19 NOTE — PATIENT INSTRUCTIONS
Patient Education   Preventive Care Advice   This is general advice given by our system to help you stay healthy. However, your care team may have specific advice just for you. Please talk to your care team about your preventive care needs.  Nutrition  Eat 5 or more servings of fruits and vegetables each day.  Try wheat bread, brown rice and whole grain pasta (instead of white bread, rice, and pasta).  Get enough calcium and vitamin D. Check the label on foods and aim for 100% of the RDA (recommended daily allowance).  Lifestyle  Exercise at least 150 minutes each week  (30 minutes a day, 5 days a week).  Do muscle strengthening activities 2 days a week. These help control your weight and prevent disease.  No smoking.  Wear sunscreen to prevent skin cancer.  Have a dental exam and cleaning every 6 months.  Yearly exams  See your health care team every year to talk about:  Any changes in your health.  Any medicines your care team has prescribed.  Preventive care, family planning, and ways to prevent chronic diseases.  Shots (vaccines)   HPV shots (up to age 26), if you've never had them before.  Hepatitis B shots (up to age 59), if you've never had them before.  COVID-19 shot: Get this shot when it's due.  Flu shot: Get a flu shot every year.  Tetanus shot: Get a tetanus shot every 10 years.  Pneumococcal, hepatitis A, and RSV shots: Ask your care team if you need these based on your risk.  Shingles shot (for age 50 and up)  General health tests  Diabetes screening:  Starting at age 35, Get screened for diabetes at least every 3 years.  If you are younger than age 35, ask your care team if you should be screened for diabetes.  Cholesterol test: At age 39, start having a cholesterol test every 5 years, or more often if advised.  Bone density scan (DEXA): At age 50, ask your care team if you should have this scan for osteoporosis (brittle bones).  Hepatitis C: Get tested at least once in your life.  STIs (sexually  transmitted infections)  Before age 24: Ask your care team if you should be screened for STIs.  After age 24: Get screened for STIs if you're at risk. You are at risk for STIs (including HIV) if:  You are sexually active with more than one person.  You don't use condoms every time.  You or a partner was diagnosed with a sexually transmitted infection.  If you are at risk for HIV, ask about PrEP medicine to prevent HIV.  Get tested for HIV at least once in your life, whether you are at risk for HIV or not.  Cancer screening tests  Cervical cancer screening: If you have a cervix, begin getting regular cervical cancer screening tests starting at age 21.  Breast cancer scan (mammogram): If you've ever had breasts, begin having regular mammograms starting at age 40. This is a scan to check for breast cancer.  Colon cancer screening: It is important to start screening for colon cancer at age 45.  Have a colonoscopy test every 10 years (or more often if you're at risk) Or, ask your provider about stool tests like a FIT test every year or Cologuard test every 3 years.  To learn more about your testing options, visit:   .  For help making a decision, visit:   https://bit.ly/sr02863.  Prostate cancer screening test: If you have a prostate, ask your care team if a prostate cancer screening test (PSA) at age 55 is right for you.  Lung cancer screening: If you are a current or former smoker ages 50 to 80, ask your care team if ongoing lung cancer screenings are right for you.  For informational purposes only. Not to replace the advice of your health care provider. Copyright   2023 [facility]. All rights reserved. Clinically reviewed by the [facility] Transitions Program. [employer] 498662 - REV 01/24.  Learning About Stress  What is stress?     Stress is your body's response to a hard situation. Your body can have a physical, emotional, or mental response. Stress is a fact of life for most people, and it  affects everyone differently. What causes stress for you may not be stressful for someone else.  A lot of things can cause stress. You may feel stress when you go on a job interview, take a test, or run a race. This kind of short-term stress is normal and even useful. It can help you if you need to work hard or react quickly. For example, stress can help you finish an important job on time.  Long-term stress is caused by ongoing stressful situations or events. Examples of long-term stress include long-term health problems, ongoing problems at work, or conflicts in your family. Long-term stress can harm your health.  How does stress affect your health?  When you are stressed, your body responds as though you are in danger. It makes hormones that speed up your heart, make you breathe faster, and give you a burst of energy. This is called the fight-or-flight stress response. If the stress is over quickly, your body goes back to normal and no harm is done.  But if stress happens too often or lasts too long, it can have bad effects. Long-term stress can make you more likely to get sick, and it can make symptoms of some diseases worse. If you tense up when you are stressed, you may develop neck, shoulder, or low back pain. Stress is linked to high blood pressure and heart disease.  Stress also harms your emotional health. It can make you rivera, tense, or depressed. Your relationships may suffer, and you may not do well at work or school.  What can you do to manage stress?  You can try these things to help manage stress:   Do something active. Exercise or activity can help reduce stress. Walking is a great way to get started. Even everyday activities such as housecleaning or yard work can help.  Try yoga or lisa chi. These techniques combine exercise and meditation. You may need some training at first to learn them.  Do something you enjoy. For example, listen to music or go to a movie. Practice your hobby or do volunteer  "work.  Meditate. This can help you relax, because you are not worrying about what happened before or what may happen in the future.  Do guided imagery. Imagine yourself in any setting that helps you feel calm. You can use online videos, books, or a teacher to guide you.  Do breathing exercises. For example:  From a standing position, bend forward from the waist with your knees slightly bent. Let your arms dangle close to the floor.  Breathe in slowly and deeply as you return to a standing position. Roll up slowly and lift your head last.  Hold your breath for just a few seconds in the standing position.  Breathe out slowly and bend forward from the waist.  Let your feelings out. Talk, laugh, cry, and express anger when you need to. Talking with supportive friends or family, a counselor, or a rajan leader about your feelings is a healthy way to relieve stress. Avoid discussing your feelings with people who make you feel worse.  Write. It may help to write about things that are bothering you. This helps you find out how much stress you feel and what is causing it. When you know this, you can find better ways to cope.  What can you do to prevent stress?  You might try some of these things to help prevent stress:  Manage your time. This helps you find time to do the things you want and need to do.  Get enough sleep. Your body recovers from the stresses of the day while you are sleeping.  Get support. Your family, friends, and community can make a difference in how you experience stress.  Limit your news feed. Avoid or limit time on social media or news that may make you feel stressed.  Do something active. Exercise or activity can help reduce stress. Walking is a great way to get started.  Where can you learn more?  Go to https://www.[employer].net/patiented  Enter N032 in the search box to learn more about \"Learning About Stress.\"  Current as of: October 24, 2023  Content Version: 14.3    2024 [employer]. "   Care instructions adapted under license by your healthcare professional. If you have questions about a medical condition or this instruction, always ask your healthcare professional. [employer] disclaims any warranty or liability for your use of this information.    Learning About Depression Screening  What is depression screening?  Depression screening is a way to see if you have depression symptoms. It may be done by a doctor or counselor. It's often part of a routine checkup. That's because your mental health is just as important as your physical health.  Depression is a mental health condition that affects how you feel, think, and act. You may:  Have less energy.  Lose interest in your daily activities.  Feel sad and grouchy for a long time.  Depression is very common. It affects people of all ages.  Many things can lead to depression. Some people become depressed after they have a stroke or find out they have a major illness like cancer or heart disease. The death of a loved one or a breakup may lead to depression. It can run in families. Most experts believe that a combination of inherited genes and stressful life events can cause it.  What happens during screening?  You may be asked to fill out a form about your depression symptoms. You and the doctor will discuss your answers. The doctor may ask you more questions to learn more about how you think, act, and feel.  What happens after screening?  If you have symptoms of depression, your doctor will talk to you about your options.  Doctors usually treat depression with medicines or counseling. Often, combining the two works best. Many people don't get help because they think that they'll get over the depression on their own. But people with depression may not get better unless they get treatment.  The cause of depression is not well understood. There may be many factors involved. But if you have depression, it's not your fault.  A serious symptom  "of depression is thinking about death or suicide. If you or someone you care about talks about this or about feeling hopeless, get help right away.  It's important to know that depression can be treated. Medicine, counseling, and self-care may help.  Where can you learn more?  Go to https://www.[employer].net/patiented  Enter T185 in the search box to learn more about \"Learning About Depression Screening.\"  Current as of: July 31, 2024  Content Version: 14.3    2024 [employer].   Care instructions adapted under license by your healthcare professional. If you have questions about a medical condition or this instruction, always ask your healthcare professional. [employer] disclaims any warranty or liability for your use of this information.       "

## 2025-02-19 NOTE — PROGRESS NOTES
Preventive Care Visit  [facility]  Celena De Paz PA-C, Physician Assistant - Medical  Feb 19, 2025    Assessment & Plan     Routine general medical examination at a health care facility  Pap up to date.   Declines labs today. Consider lipids next year    Encounter for other general counseling or advice on contraception  Discussed options, considering nuvaring vs OCP. She will let me know what she decides.    TMJ tenderness, right  Avoid chewy/crunchy foods, NSAIDs x1 week. PT referral. Follow up with dentist regarding mouth guard. Plan for orthodontic work eventually which may relate to her pain.  - Physical Therapy  Referral    Need for COVID-19 vaccine  - COVID-19 12+ (PFIZER)      Counseling  Appropriate preventive services were addressed with this patient via screening, questionnaire, or discussion as appropriate for fall prevention, nutrition, physical activity, Tobacco-use cessation, social engagement, weight loss and cognition.  Checklist reviewing preventive services available has been given to the patient.  Reviewed patient's diet, addressing concerns and/or questions.   She is at risk for lack of exercise and has been provided with information to increase physical activity for the benefit of her well-being.   She is at risk for psychosocial distress and has been provided with information to reduce risk.   The patient's PHQ-9 score is consistent with mild depression. She was provided with information regarding depression.       Celena Rivera PA-C on 2/19/2025 at 3:08 PM      Priti Fraser is a 24 year old, presenting for the following:  Physical        2/19/2025     2:52 PM   Additional Questions   Roomed by Carmen FELTON    Patient would like to discuss birth control options.   Previously on the pill when living in [place]     - pap normal 3/2024, repeat due 2027      Concern - right Jaw problem   Onset: 2021   Description: jaw locks when opening mouth  moderate assist (50% patients effort) wide  Intensity: moderate  Progression of Symptoms:  worsening and intermittent  Previous history of similar problem: no  Precipitating factors:        Worsened by: clinching jaw, chewing gum, talking for long periods of times, eat hard food.  Alleviating factors:        Improved by: prevent speaking to much or eating hard texture foods or chew gum        Health Care Directive  Patient does not have a Health Care Directive: Discussed advance care planning with patient; information given to patient to review.      2/18/2025   General Health   How would you rate your overall physical health? Good   Feel stress (tense, anxious, or unable to sleep) To some extent   (!) STRESS CONCERN      2/18/2025   Nutrition   Three or more servings of calcium each day? Yes   Diet: Regular (no restrictions)   How many servings of fruit and vegetables per day? (!) 0-1   How many sweetened beverages each day? 0-1         2/18/2025   Exercise   Days per week of moderate/strenous exercise 2 days   Average minutes spent exercising at this level 30 min   (!) EXERCISE CONCERN      2/18/2025   Social Factors   Frequency of gathering with friends or relatives Twice a week   Worry food won't last until get money to buy more No   Food not last or not have enough money for food? No   Do you have housing? (Housing is defined as stable permanent housing and does not include staying ouside in a car, in a tent, in an abandoned building, in an overnight shelter, or couch-surfing.) No   Are you worried about losing your housing? No   Lack of transportation? No   Unable to get utilities (heat,electricity)? No   Want help with housing or utility concern? No   (!) HOUSING CONCERN PRESENT      2/18/2025   Dental   Dentist two times every year? Yes          Today's PHQ-9 Score:       2/18/2025     1:35 PM   PHQ-9 SCORE   PHQ-9 Total Score MyChart 7 (Mild depression)   PHQ-9 Total Score 7        Patient-reported         2/18/2025   Substance Use   Alcohol  "more than 3/day or more than 7/wk No   Do you use any other substances recreationally? No     Social History     Tobacco Use    Smoking status: Never    Smokeless tobacco: Never   Vaping Use    Vaping status: Never Used   Substance Use Topics    Alcohol use: Yes     Comment: 1/2 drinks a month    Drug use: Never         2/18/2025   STI Screening   New sexual partner(s) since last STI/HIV test? (!) DECLINE     History of abnormal Pap smear: No - age 21-29 PAP every 3 years recommended        3/7/2024     4:57 PM   PAP / HPV   PAP Negative for Intraepithelial Lesion or Malignancy (NILM)            2/18/2025   Contraception/Family Planning   Questions about contraception or family planning (!) YES        Reviewed and updated as needed this visit by Provider   Tobacco  Allergies  Meds  Problems  Med Hx  Surg Hx  Fam Hx            Past Medical History:   Diagnosis Date    ADHD (attention deficit hyperactivity disorder)     Angular cheilitis     Follows with dermatology    Congenital anteversion of both femurs     Depressive disorder 2017    TERESITA (generalized anxiety disorder)     Prognathism      History reviewed. No pertinent surgical history.      Review of Systems  Constitutional, HEENT, cardiovascular, pulmonary, GI, , musculoskeletal, neuro, skin, endocrine and psych systems are negative, except as otherwise noted.     Objective    Exam  /72 (BP Location: Right arm, Patient Position: Sitting, Cuff Size: Adult Regular)   Pulse 87   Temp 98.4  F (36.9  C) (Tympanic)   Resp 14   Ht 1.71 m (5' 7.32\")   Wt 69.1 kg (152 lb 6.4 oz)   SpO2 99%   BMI 23.64 kg/m     Estimated body mass index is 23.64 kg/m  as calculated from the following:    Height as of this encounter: 1.71 m (5' 7.32\").    Weight as of this encounter: 69.1 kg (152 lb 6.4 oz).    Physical Exam  GENERAL: alert and no distress  EYES: Eyes grossly normal to inspection, PERRL and conjunctivae and sclerae normal  HENT: ear canals and TM's " normal, nose and mouth without ulcers or lesions  NECK: no adenopathy, no asymmetry, masses, or scars  RESP: lungs clear to auscultation - no rales, rhonchi or wheezes  BREAST: normal without masses, tenderness or nipple discharge and no palpable axillary masses or adenopathy  CV: regular rate and rhythm, normal S1 S2, no S3 or S4, no murmur, click or rub, no peripheral edema  ABDOMEN: soft, nontender, no hepatosplenomegaly, no masses and bowel sounds normal  MS: no gross musculoskeletal defects noted, no edema  SKIN: no suspicious lesions or rashes  NEURO: Normal strength and tone, mentation intact and speech normal  PSYCH: mentation appears normal, affect normal/bright        Signed Electronically by: Celena Rivera PA-C on 2/19/2025 at 3:09 PM

## 2025-03-03 ENCOUNTER — OFFICE VISIT (OUTPATIENT)
Dept: OPTOMETRY | Facility: CLINIC | Age: 25
End: 2025-03-03
Payer: COMMERCIAL

## 2025-03-03 DIAGNOSIS — H10.13 ALLERGIC CONJUNCTIVITIS OF BOTH EYES: ICD-10-CM

## 2025-03-03 DIAGNOSIS — Z01.00 EXAMINATION OF EYES AND VISION: Primary | ICD-10-CM

## 2025-03-03 DIAGNOSIS — H52.13 MYOPIA OF BOTH EYES: ICD-10-CM

## 2025-03-03 PROCEDURE — 92015 DETERMINE REFRACTIVE STATE: CPT | Performed by: OPTOMETRIST

## 2025-03-03 PROCEDURE — 92004 COMPRE OPH EXAM NEW PT 1/>: CPT | Performed by: OPTOMETRIST

## 2025-03-03 RX ORDER — AZELASTINE HYDROCHLORIDE 0.5 MG/ML
1 SOLUTION/ DROPS OPHTHALMIC 2 TIMES DAILY PRN
Qty: 6 ML | Refills: 11 | Status: SHIPPED | OUTPATIENT
Start: 2025-03-03 | End: 2026-03-03

## 2025-03-03 RX ORDER — DEXTROAMPHETAMINE SACCHARATE, AMPHETAMINE ASPARTATE, DEXTROAMPHETAMINE SULFATE AND AMPHETAMINE SULFATE 1.25; 1.25; 1.25; 1.25 MG/1; MG/1; MG/1; MG/1
TABLET ORAL
COMMUNITY
Start: 2025-02-26

## 2025-03-03 ASSESSMENT — REFRACTION_MANIFEST
OD_SPHERE: -0.50
OS_SPHERE: -0.50
OS_CYLINDER: SPHERE
OD_CYLINDER: SPHERE

## 2025-03-03 ASSESSMENT — KERATOMETRY
OD_K2POWER_DIOPTERS: 42.50
OD_AXISANGLE2_DEGREES: 162
OD_AXISANGLE_DEGREES: 072
OS_AXISANGLE_DEGREES: 090
OD_K1POWER_DIOPTERS: 42.00
OS_K2POWER_DIOPTERS: 42.50
OS_K1POWER_DIOPTERS: 42.00
OS_AXISANGLE2_DEGREES: 180

## 2025-03-03 ASSESSMENT — EXTERNAL EXAM - LEFT EYE: OS_EXAM: NORMAL

## 2025-03-03 ASSESSMENT — CONF VISUAL FIELD
OD_INFERIOR_NASAL_RESTRICTION: 0
OD_NORMAL: 1
OD_INFERIOR_TEMPORAL_RESTRICTION: 0
OS_INFERIOR_TEMPORAL_RESTRICTION: 0
OS_NORMAL: 1
OD_SUPERIOR_NASAL_RESTRICTION: 0
OS_SUPERIOR_NASAL_RESTRICTION: 0
OS_SUPERIOR_TEMPORAL_RESTRICTION: 0
OS_INFERIOR_NASAL_RESTRICTION: 0
OD_SUPERIOR_TEMPORAL_RESTRICTION: 0

## 2025-03-03 ASSESSMENT — TONOMETRY
IOP_METHOD: APPLANATION
IOP_METHOD: BOTH EYES NORMAL BY PALPATION
OS_IOP_MMHG: SOFT
IOP_UNABLETOASSESS: 1
IOP_UNABLETOASSESS: 1
IOP_METHOD: ICARE
OD_IOP_MMHG: SOFT

## 2025-03-03 ASSESSMENT — VISUAL ACUITY
OS_PH_SC: 20/20
OD_SC+: -1
METHOD: SNELLEN - LINEAR
OS_SC: 20/20
OD_SC: 20/30
OD_SC: 20/20
OS_PH_SC+: -1
OS_SC+: -1
OS_SC: 20/50

## 2025-03-03 ASSESSMENT — SLIT LAMP EXAM - LIDS
COMMENTS: NORMAL
COMMENTS: NORMAL

## 2025-03-03 ASSESSMENT — CUP TO DISC RATIO
OS_RATIO: 0.3
OD_RATIO: 0.35

## 2025-03-03 ASSESSMENT — EXTERNAL EXAM - RIGHT EYE: OD_EXAM: NORMAL

## 2025-03-03 NOTE — PATIENT INSTRUCTIONS
Eyeglass prescription given.  Glasses for distance vision only.    Optivar 1 drop both eyes once in am and once in pm for 3-4 weeks then as needed for allergies.    Return in 1 year for a complete eye exam or sooner if needed.    Isaac Gracia, EDUARDO    The affects of the dilating drops last for 4- 6 hours.  You will be more sensitive to light and vision will be blurry up close.  Do not drive if you do not feel comfortable.  Mydriatic sunglasses were given if needed.      Optometry Providers       Clinic Locations                                 Telephone Number   Dr. Gayatri Osborn    [facility]  [place] [phone]     [facility] Hours:                [facility] Hours:       [place] Hours:   [address]   [address] [facility]     [address]  [place] [address]   [place] [address]    [place] [address]  Phone: [phone]                    Phone: [phone]     Phone: [phone]                      Monday 8:00-6:00                          Monday 8:00-6:00                          Monday 8:00-6:00              Tuesday 8:00-6:00                          Tuesday 8:00-6:00                          Tuesday 8:00-6:00              Wednesday 8:00-6:00                  Wednesday 8:00-6:00                   Wednesday 8:00-6:00      Thursday 8:00-6:00                        Thursday 8:00-6:00                         Thursday 8:00-6:00            Friday 8:00-5:00                              Friday 8:00-5:00                              Friday 8:00-5:00    [place] [facility] Hours:   [address] [place] [address]  [phone]    Monday 9:00-6:00  Tuesday 9:00-6:00  Wednesday 9:00-6:00  Thursday 9:00-6:00  Friday 9:00-5:00  As always, Thank you for trusting us with your health care  needs!

## 2025-03-03 NOTE — LETTER
3/3/2025      Bria Hamilton  [address]  [place] [address]      Dear Colleague,    Thank you for referring your patient, Bria Hamilton, to the [facility]. Please see a copy of my visit note below.    Chief Complaint   Patient presents with     Annual Eye Exam         Last Eye Exam: 2021  Dilated Previously: patient unsure, side effects of dilation explained today    What are you currently using to see?  does not use glasses or contacts       Distance Vision Acuity: Noticed gradual change in both eyes    Near Vision Acuity: Satisfied with vision while reading  unaided    Eye Comfort: watery and pain in corner of eyes when has allergies   Do you use eye drops? : Yes: otc allergy drops ?  Occupation or Hobbies: patient  for     [employer]      Medical, surgical and family histories reviewed and updated 3/3/2025.       OBJECTIVE: See Ophthalmology exam    ASSESSMENT:    ICD-10-CM    1. Examination of eyes and vision  Z01.00 EYE EXAM (SIMPLE-NONBILLABLE)      2. Myopia of both eyes  H52.13 REFRACTION      3. Allergic conjunctivitis of both eyes  H10.13 EYE EXAM (SIMPLE-NONBILLABLE)     azelastine (OPTIVAR) 0.05 % ophthalmic solution          PLAN:     Patient Instructions   Eyeglass prescription given.  Glasses for distance vision only.    Optivar 1 drop both eyes once in am and once in pm for 3-4 weeks then as needed for allergies.    Return in 1 year for a complete eye exam or sooner if needed.    Isaac Gracia, EDUARDO       Again, thank you for allowing me to participate in the care of your patient.        Sincerely,        Isaac Gracia, OD    Electronically signed

## 2025-03-03 NOTE — PROGRESS NOTES
Chief Complaint   Patient presents with    Annual Eye Exam         Last Eye Exam: 2021  Dilated Previously: patient unsure, side effects of dilation explained today    What are you currently using to see?  does not use glasses or contacts       Distance Vision Acuity: Noticed gradual change in both eyes    Near Vision Acuity: Satisfied with vision while reading  unaided    Eye Comfort: watery and pain in corner of eyes when has allergies   Do you use eye drops? : Yes: otc allergy drops ?  Occupation or Hobbies: patient  for     [employer]      Medical, surgical and family histories reviewed and updated 3/3/2025.       OBJECTIVE: See Ophthalmology exam    ASSESSMENT:    ICD-10-CM    1. Examination of eyes and vision  Z01.00 EYE EXAM (SIMPLE-NONBILLABLE)      2. Myopia of both eyes  H52.13 REFRACTION      3. Allergic conjunctivitis of both eyes  H10.13 EYE EXAM (SIMPLE-NONBILLABLE)     azelastine (OPTIVAR) 0.05 % ophthalmic solution          PLAN:     Patient Instructions   Eyeglass prescription given.  Glasses for distance vision only.    Optivar 1 drop both eyes once in am and once in pm for 3-4 weeks then as needed for allergies.    Return in 1 year for a complete eye exam or sooner if needed.    Isaac Gracia, OD

## 2025-03-04 ENCOUNTER — THERAPY VISIT (OUTPATIENT)
Dept: PHYSICAL THERAPY | Facility: CLINIC | Age: 25
End: 2025-03-04
Payer: COMMERCIAL

## 2025-03-04 DIAGNOSIS — M26.609 TMJ (TEMPOROMANDIBULAR JOINT SYNDROME): Primary | ICD-10-CM

## 2025-03-04 DIAGNOSIS — M26.621 TMJ TENDERNESS, RIGHT: ICD-10-CM

## 2025-03-04 PROCEDURE — 97161 PT EVAL LOW COMPLEX 20 MIN: CPT | Mod: GP | Performed by: PHYSICAL THERAPIST

## 2025-03-04 PROCEDURE — 97110 THERAPEUTIC EXERCISES: CPT | Mod: GP | Performed by: PHYSICAL THERAPIST

## 2025-03-04 NOTE — PROGRESS NOTES
PHYSICAL THERAPY EVALUATION  Type of Visit: Evaluation              Subjective         Presenting condition or subjective complaint: left side jaw popping and jaw locking. Jaw pain  Date of onset: 03/01/24    Pain has been going on for the past couple of years but worsened over the past year.  The L side of her jaw grew longer than her R side after orthodotic work.  She moved here from [place] in July 2023.  Her orthodontist in [place] felt that she will need surgery at some point to fix it.  She is trying to hold off on that because of getting  this September.  Relevant medical history: Depression; Mental Illness   Dates & types of surgery: wisdom tooth removal spring 2018    Prior diagnostic imaging/testing results:       Prior therapy history for the same diagnosis, illness or injury: No      Prior Level of Function  Transfers:   Ambulation:   ADL:   IADL:     Living Environment  Social support: Alone   Type of home: Apartment/condo   Stairs to enter the home: No       Ramp: Yes   Stairs inside the home: Yes 2 Is there a railing: Yes     Help at home: None  Equipment owned:       Employment: Yes Patient - [employer] Work[employer] at [employer]  Hobbies/Interests: sewing, spending time with friends,    Patient goals for therapy: lessen my jaw pain, hopefully be able to lessen how often my jaw pops    Pain assessment:      Objective   TMJ/TMD EVALUATION  ADDITIONAL HISTORY:  Parafunctional habits: Bruxism, Clenching  Stressors:   Associate symptoms: popping and clicking  Headache:  B temporal if eating harder things or talking really loudly singing - can improve a couple of hours later and sometimes takes ibuprofen  Exercise routine:   Symptoms reported: Clicking, Locking  Dentist: 2x/year  Joint lock: Joint locks open: Associated symptoms: popping, pain    PAIN: Pain Level at Rest: 1/10  Pain Level with Use: 9/10  Pain Location: B TMJ  Pain Quality: Dull and Sharp  Pain Frequency:  intermittent  Pain is Worst: worsens as the day goes on  Pain is Exacerbated By: chewing, singing, talking (especially loudly)  Pain is Relieved By: ibuprofen, rest jaw  Pain Progression: Worsened  X-rays - before orthodotic work and after    OBSERVATION/FACIAL SYMMETRY: normal  POSITIONING:  pt has no overbite and her L side of jaw is in a slight underbite position and is postioned in 4 mm right lateral trusion  INTEGUMENTARY:   POSTURE:  slight forward head position    INTRAORAL MOUTH APPLIANCE:  pt just got one - it was off the shelf - [employer]  CERVICAL ROM:  min loss with retraction, otherwise WNL    TMJ ROM: starting position is 4 mm R lateral trusion   mm Joint Noise Deviation Pain   Opening 44 Without pain  Able to go just to point before feeling like it will lock No deviation, however entire upper bite is 4 mm    Left Lateral Deviation 9 (4+5) No pain or clicking     Right Lateral Deviation 8 (4+4) No pain or clicking     Protrusion Didn't measure Click midway in motion     Retrusion         STRENGTH:     Left Right   Mid Trap     Lower Trap     Rhomboid     SPECIAL TESTS:   PALPATION:   JOINT MOBILITY/ACCESSORY MOTION:   TMD FINDINGS:  Bite/Occlusion:  Tongue Positioning:  Mucosal Ridging:  Tongue Scalloping:  Accelerated Tooth Wear:    Strength testing of jaw muscles:  Resisted R lateral deviation (+) at R jaw  Resisted L lateral deviation, resisted opening, and resisted closing - no pain    Movement during resistance to B deviation resulted in pain at B TMJ.  No pain with movement during resistance with opening or closing of jaw    MYOTOMES:   DERMATOMES:   NEURAL TENSION:   SPINAL SEGMENTAL CONCLUSIONS:     Assessment & Plan   CLINICAL IMPRESSIONS  Medical Diagnosis: TMJ tenderness, right    Treatment Diagnosis: TMJ   Impression/Assessment: Patient is a 25 year old female with B jaw complaints.  The following significant findings have been identified: Pain, Decreased ROM/flexibility, Inflammation,  Impaired muscle performance, and Decreased activity tolerance. These impairments interfere with their ability to perform  chewing, talking, yawning, singing  as compared to previous level of function.     Clinical Decision Making (Complexity):  Clinical Presentation: Stable/Uncomplicated  Clinical Presentation Rationale: based on medical and personal factors listed in PT evaluation  Clinical Decision Making (Complexity): Low complexity    PLAN OF CARE  Treatment Interventions:  Modalities: Ultrasound  Interventions: Manual Therapy, Neuromuscular Re-education, Therapeutic Activity, Therapeutic Exercise    Long Term Goals     PT Goal 1  Goal Identifier: chewing  Goal Description: Pt will be able to chew harder food with 3/10 concordant jaw pain for improved quality of  nutrtion.  Target Date: 04/29/25  PT Goal 2  Goal Identifier: yawning  Goal Description: Pt will be able to yawn with 3/10 concordant jaw pain.  Rationale: to maximize safety and independence with self cares;to maximize safety and independence with performance of ADLs and functional tasks  Target Date: 05/13/25      Frequency of Treatment: 2-3x/month  Duration of Treatment: 2-3 months    Recommended Referrals to Other Professionals:   Education Assessment:   Learner/Method: No Barriers to Learning    Risks and benefits of evaluation/treatment have been explained.   Patient/Family/caregiver agrees with Plan of Care.     Evaluation Time:     PT Eval, Low Complexity Minutes (02189): 15       Signing Clinician: Annalee Melendez PT

## 2025-03-18 ENCOUNTER — THERAPY VISIT (OUTPATIENT)
Dept: PHYSICAL THERAPY | Facility: CLINIC | Age: 25
End: 2025-03-18
Payer: COMMERCIAL

## 2025-03-18 DIAGNOSIS — M26.609 TMJ (TEMPOROMANDIBULAR JOINT SYNDROME): ICD-10-CM

## 2025-03-18 DIAGNOSIS — M26.621 TMJ TENDERNESS, RIGHT: Primary | ICD-10-CM

## 2025-03-18 PROCEDURE — 97140 MANUAL THERAPY 1/> REGIONS: CPT | Mod: GP | Performed by: PHYSICAL THERAPIST

## 2025-03-18 PROCEDURE — 97110 THERAPEUTIC EXERCISES: CPT | Mod: GP | Performed by: PHYSICAL THERAPIST

## 2025-03-21 ENCOUNTER — MYC MEDICAL ADVICE (OUTPATIENT)
Dept: DERMATOLOGY | Facility: CLINIC | Age: 25
End: 2025-03-21
Payer: COMMERCIAL

## 2025-03-21 DIAGNOSIS — L30.9 DERMATITIS: ICD-10-CM

## 2025-03-21 DIAGNOSIS — K13.0 ANGULAR CHEILITIS: Primary | ICD-10-CM

## 2025-03-21 NOTE — TELEPHONE ENCOUNTER
# Angular chelitis, chronic, acute flare, not-at-goal.  No sustained improvement in cheilitis with ketoconazole cream. Uses vaseline frequently for chapstick. She had initial improvement with ketoconazole but then fissures returned despite continued use. Wonder about bacterial component. Discussed utility of adding nystatin, however unlikely to have marked benefit given failure to improve w ketoconazole. Will trial topical mupirocin. Would except fissures to heal while using, although may recur following discontinuation.   - begin mupirocin bid until healed  - send my chart message in 6 weeks  - send [employer] message to Dr. Alex if no improvement. At that time, would begin tacrolimus ointment vs hydrocortisone ointment (tried and failed in the past)  - follow-up in 4 months

## 2025-03-31 RX ORDER — TACROLIMUS 1 MG/G
OINTMENT TOPICAL 2 TIMES DAILY
Qty: 60 G | Refills: 1 | Status: SHIPPED | OUTPATIENT
Start: 2025-03-31

## 2025-04-01 ENCOUNTER — THERAPY VISIT (OUTPATIENT)
Dept: PHYSICAL THERAPY | Facility: CLINIC | Age: 25
End: 2025-04-01
Payer: COMMERCIAL

## 2025-04-01 DIAGNOSIS — M26.621 TMJ TENDERNESS, RIGHT: Primary | ICD-10-CM

## 2025-04-01 DIAGNOSIS — M26.609 TMJ (TEMPOROMANDIBULAR JOINT SYNDROME): ICD-10-CM

## 2025-04-01 PROCEDURE — 97140 MANUAL THERAPY 1/> REGIONS: CPT | Mod: GP | Performed by: PHYSICAL THERAPIST

## 2025-04-01 PROCEDURE — 97110 THERAPEUTIC EXERCISES: CPT | Mod: GP | Performed by: PHYSICAL THERAPIST

## 2025-04-02 NOTE — TELEPHONE ENCOUNTER
Dee Alex MD  [facility] Adult Csc44 minutes ago (7:40 AM)       She should continue the tacrolimus ointment and we should check her lips at the upcoming appointment.

## 2025-04-04 ENCOUNTER — ANCILLARY PROCEDURE (OUTPATIENT)
Dept: GENERAL RADIOLOGY | Facility: CLINIC | Age: 25
End: 2025-04-04
Attending: FAMILY MEDICINE
Payer: COMMERCIAL

## 2025-04-04 DIAGNOSIS — R07.1 CHEST PAIN ON BREATHING: ICD-10-CM

## 2025-04-04 PROBLEM — F41.8 MIXED ANXIETY AND DEPRESSIVE DISORDER: Status: ACTIVE | Noted: 2021-12-06

## 2025-04-04 PROBLEM — F32.89 OTHER SPECIFIED DEPRESSIVE EPISODES: Chronic | Status: ACTIVE | Noted: 2021-12-06

## 2025-04-04 PROBLEM — M35.7 HYPERMOBILITY SYNDROME: Status: ACTIVE | Noted: 2021-11-03

## 2025-04-04 PROCEDURE — 71046 X-RAY EXAM CHEST 2 VIEWS: CPT | Mod: TC | Performed by: RADIOLOGY

## 2025-04-07 ENCOUNTER — OFFICE VISIT (OUTPATIENT)
Dept: INTERNAL MEDICINE | Facility: CLINIC | Age: 25
End: 2025-04-07
Payer: COMMERCIAL

## 2025-04-07 VITALS
HEART RATE: 85 BPM | OXYGEN SATURATION: 99 % | WEIGHT: 154.7 LBS | SYSTOLIC BLOOD PRESSURE: 117 MMHG | DIASTOLIC BLOOD PRESSURE: 75 MMHG | BODY MASS INDEX: 23.45 KG/M2 | TEMPERATURE: 97.2 F | HEIGHT: 68 IN

## 2025-04-07 DIAGNOSIS — R00.0 RAPID HEART RATE: ICD-10-CM

## 2025-04-07 DIAGNOSIS — R00.2 PALPITATIONS: Primary | ICD-10-CM

## 2025-04-07 DIAGNOSIS — M94.0 COSTOCHONDRITIS: ICD-10-CM

## 2025-04-07 LAB
ALBUMIN SERPL BCG-MCNC: 4.3 G/DL (ref 3.5–5.2)
ALP SERPL-CCNC: 85 U/L (ref 40–150)
ALT SERPL W P-5'-P-CCNC: 5 U/L (ref 0–50)
ANION GAP SERPL CALCULATED.3IONS-SCNC: 11 MMOL/L (ref 7–15)
AST SERPL W P-5'-P-CCNC: 20 U/L (ref 0–45)
BASOPHILS # BLD AUTO: 0 10E3/UL (ref 0–0.2)
BASOPHILS NFR BLD AUTO: 0 %
BILIRUB SERPL-MCNC: 0.2 MG/DL
BUN SERPL-MCNC: 7.2 MG/DL (ref 6–20)
CALCIUM SERPL-MCNC: 9.4 MG/DL (ref 8.8–10.4)
CHLORIDE SERPL-SCNC: 104 MMOL/L (ref 98–107)
CREAT SERPL-MCNC: 0.76 MG/DL (ref 0.51–0.95)
EGFRCR SERPLBLD CKD-EPI 2021: >90 ML/MIN/1.73M2
EOSINOPHIL # BLD AUTO: 0.1 10E3/UL (ref 0–0.7)
EOSINOPHIL NFR BLD AUTO: 1 %
ERYTHROCYTE [DISTWIDTH] IN BLOOD BY AUTOMATED COUNT: 12 % (ref 10–15)
GLUCOSE SERPL-MCNC: 121 MG/DL (ref 70–99)
HCO3 SERPL-SCNC: 24 MMOL/L (ref 22–29)
HCT VFR BLD AUTO: 39.1 % (ref 35–47)
HGB BLD-MCNC: 13.2 G/DL (ref 11.7–15.7)
IMM GRANULOCYTES # BLD: 0 10E3/UL
IMM GRANULOCYTES NFR BLD: 0 %
LYMPHOCYTES # BLD AUTO: 2 10E3/UL (ref 0.8–5.3)
LYMPHOCYTES NFR BLD AUTO: 22 %
MCH RBC QN AUTO: 28 PG (ref 26.5–33)
MCHC RBC AUTO-ENTMCNC: 33.8 G/DL (ref 31.5–36.5)
MCV RBC AUTO: 83 FL (ref 78–100)
MONOCYTES # BLD AUTO: 0.5 10E3/UL (ref 0–1.3)
MONOCYTES NFR BLD AUTO: 6 %
NEUTROPHILS # BLD AUTO: 6.2 10E3/UL (ref 1.6–8.3)
NEUTROPHILS NFR BLD AUTO: 71 %
PLATELET # BLD AUTO: 361 10E3/UL (ref 150–450)
POTASSIUM SERPL-SCNC: 4 MMOL/L (ref 3.4–5.3)
PROT SERPL-MCNC: 7.7 G/DL (ref 6.4–8.3)
RBC # BLD AUTO: 4.71 10E6/UL (ref 3.8–5.2)
SODIUM SERPL-SCNC: 139 MMOL/L (ref 135–145)
TSH SERPL DL<=0.005 MIU/L-ACNC: 2.69 UIU/ML (ref 0.3–4.2)
WBC # BLD AUTO: 8.8 10E3/UL (ref 4–11)

## 2025-04-07 PROCEDURE — 84443 ASSAY THYROID STIM HORMONE: CPT | Performed by: PHYSICIAN ASSISTANT

## 2025-04-07 PROCEDURE — 85025 COMPLETE CBC W/AUTO DIFF WBC: CPT | Performed by: PHYSICIAN ASSISTANT

## 2025-04-07 PROCEDURE — 3074F SYST BP LT 130 MM HG: CPT | Performed by: PHYSICIAN ASSISTANT

## 2025-04-07 PROCEDURE — 99214 OFFICE O/P EST MOD 30 MIN: CPT | Performed by: PHYSICIAN ASSISTANT

## 2025-04-07 PROCEDURE — 3078F DIAST BP <80 MM HG: CPT | Performed by: PHYSICIAN ASSISTANT

## 2025-04-07 PROCEDURE — 80053 COMPREHEN METABOLIC PANEL: CPT | Performed by: PHYSICIAN ASSISTANT

## 2025-04-07 PROCEDURE — 36415 COLL VENOUS BLD VENIPUNCTURE: CPT | Performed by: PHYSICIAN ASSISTANT

## 2025-04-07 ASSESSMENT — ENCOUNTER SYMPTOMS: NERVOUS/ANXIOUS: 1

## 2025-04-07 NOTE — PROGRESS NOTES
"  Assessment & Plan     Palpitations    - ZIO PATCH MAIL OUT; Future  -  - CBC with platelets and differential  - Comprehensive metabolic panel (BMP + Alb, Alk Phos, ALT, AST, Total. Bili, TP)  - TSH with free T4 reflex    Rapid heart rate      Costochondritis    Reviewed UC visit and concerns with patient  Labs and Zio patch as noted.  Follow up after with primary   Reviewed medications/supplements caffeine in diet   Limit as able                Priti Fraser is a 25 year old, presenting for the following health issues:  Chest Pain    History of Present Illness       Reason for visit:  Chest pain, heart rate spikes while walking/standing. Hoping to get a cardiology referral. Was seen by urgent care on 4/4  Symptoms include:  Pain in the rib cage, heart rate spikes  Symptom intensity:  Moderate  Symptom progression:  Staying the same  Had these symptoms before:  No  What makes it worse:  Standing/walking  What makes it better:  Laying down or sitting   She is taking medications regularly.                      Objective    /75   Pulse 85   Temp 97.2  F (36.2  C) (Temporal)   Ht 1.715 m (5' 7.5\")   Wt 70.2 kg (154 lb 11.2 oz)   LMP 03/23/2025 (Exact Date)   SpO2 99%   BMI 23.87 kg/m    Body mass index is 23.87 kg/m .  Physical Exam   GENERAL: alert and no distress  RESP: lungs clear to auscultation - no rales, rhonchi or wheezes  CV: regular rates and rhythm, normal S1 S2, no S3 or S4, and no murmur, click or rub  MS: no gross musculoskeletal defects noted, no edema    XR Chest 2 Views    Result Date: 4/4/2025  EXAM: XR CHEST 2 VIEWS LOCATION: [facility] DATE: 4/4/2025 INDICATION: pleuritic pain COMPARISON: None.     IMPRESSION: Negative chest.         Signed Electronically by: Christina Rosales PA-C    "

## 2025-04-14 ENCOUNTER — OFFICE VISIT (OUTPATIENT)
Dept: DERMATOLOGY | Facility: CLINIC | Age: 25
End: 2025-04-14
Attending: DERMATOLOGY
Payer: COMMERCIAL

## 2025-04-14 DIAGNOSIS — L30.9 DERMATITIS: ICD-10-CM

## 2025-04-14 DIAGNOSIS — L60.3 DYSTROPHIC NAIL: ICD-10-CM

## 2025-04-14 DIAGNOSIS — K13.0 ANGULAR CHEILITIS: ICD-10-CM

## 2025-04-14 PROCEDURE — 88312 SPECIAL STAINS GROUP 1: CPT | Mod: 26 | Performed by: DERMATOLOGY

## 2025-04-14 PROCEDURE — 88304 TISSUE EXAM BY PATHOLOGIST: CPT | Mod: 26 | Performed by: DERMATOLOGY

## 2025-04-14 PROCEDURE — 88304 TISSUE EXAM BY PATHOLOGIST: CPT | Mod: TC | Performed by: DERMATOLOGY

## 2025-04-14 RX ORDER — TACROLIMUS 1 MG/G
OINTMENT TOPICAL 2 TIMES DAILY
Qty: 60 G | Refills: 1 | Status: SHIPPED | OUTPATIENT
Start: 2025-04-14

## 2025-04-14 ASSESSMENT — PAIN SCALES - GENERAL: PAINLEVEL_OUTOF10: NO PAIN (0)

## 2025-04-14 NOTE — NURSING NOTE
Dermatology Rooming Note    Bria Hamilton's goals for this visit include:   Chief Complaint   Patient presents with    Derm Problem     PT reports that it ''goes well for awhile'' reports that the corners have improved but the surface of lips will ebb and flow. Better than last visit but not back to normal     Estefani Riddle - EMT

## 2025-04-14 NOTE — LETTER
4/14/2025       RE: Bria Hamilton  [address] [place] [id number]     Dear Colleague,    Thank you for referring your patient, Bria Hamilton, to the [facility] DERMATOLOG[facility] [place] at [facility]. Please see a copy of my visit note below.    [facility] Dermatology Note  Encounter Date: Apr 14, 2025  Office Visit     Dermatology Problem List:  # lip dermatitis + angular cheilitis, favoring allergic contact; chronic, slow improvement  Current tx: tacrolimus + gentle skin care; previous: mupirocin, hydrocortisone, keto  # seb derm --> H&S, keto cream PRN  # atopic history (hay fever, allergic to grass, KP)  ____________________________________________    Assessment & Plan:   # lip dermatitis + angular cheilitis, favoring allergic contact; chronic, slow improvement  # atopic history (hay fever, allergic to grass, KP)  Refractory to mupirocin, topical antifungals (ketoconazole), hydrocortisone, gentle skin care. Some improvement with tacrolimus, but will try to identify the trigger (lip stick, oral allergen (toothpaste?), airborne?)  - continue topical tacrolimus BID   - OK to continue Vaseline, sun protection  - Dermato-allergy referral    # left underarm dermatitis, subacute; asymptomatic  Slight erythematous plaque, ACD versus early GA? Will CTM. Biopsy if significantly worsening.   - START tacrolimus BID until resolved    # seborrheic dermatitis, chronic, well controlled.   - ketoconazole 2% bid prn application to rash areas.  - Head&Shoulders shampoo as face wash at least twice a week as tolerated.    # right great toe dystrophy  Ddx traumatic injury versus fungus  Pending toenail clipping. Would then consider terbinafine versus a topical ciclopirox (discussed these at length today).     Procedures Performed: None    Follow-up: in dermato-allergy clinic, prn for new or changing lesions    Staff and Resident:      Staffed with Dr. Alex.     Edgar Sung MD  Dermatology Resident  I, Dee Alex MD, saw this patient with the resident and agree with the resident s findings and plan of care as documented in the resident s note.    ____________________________________________    CC: Derm Problem (PT reports that it ''goes well for awhile'' reports that the corners have improved but the surface of lips will ebb and flow. Better than last visit but not back to normal)    HPI:  Ms. Bria Hamilton is a(n) 25 year old female who presents today as a return patient for angular cheilitis. First started in 2021. Now the best it has been after using the tacrolimus.     Currently using:  Vanicream  Sunscreen  Tacrolimus   CeraVe cleanser    Allergy testing for seasonal allergies recently found an allergy to grass. Using antihistamines, don't really help with the lips much. Recently traveled to [place] with a ton of pollen, and lips were worse. Mom with history of skin lupus. Family history of atopy. Patient is otherwise feeling well, without additional skin concerns.    Labs Reviewed:  N/A    Physical Exam:  Vitals: LMP 03/23/2025 (Exact Date)   SKIN: Focused examination of face, lips, and left under arm was performed.  - Eczematous plaques on the bilateral upper and lower lips, improved from prior  - Subtle lunate red dermatitis plaque under the left upper arm  - No other lesions of concern on areas examined.     Medications:  Current Outpatient Medications   Medication Sig Dispense Refill     amphetamine-dextroamphetamine (ADDERALL XR) 10 MG 24 hr capsule Take 10 mg by mouth daily       amphetamine-dextroamphetamine (ADDERALL) 5 MG tablet as needed.       azelastine (OPTIVAR) 0.05 % ophthalmic solution Place 1 drop into both eyes 2 times daily as needed (for itchy eyes). 6 mL 11     escitalopram (LEXAPRO) 20 MG tablet Take 20 mg by mouth daily.       ketoconazole (NIZORAL) 2 % external cream Apply topically daily  (Patient taking differently: Apply topically as needed.) 60 g 11     mupirocin (BACTROBAN) 2 % external ointment Use 2 times a day to affected area until healed (probably 7-10 days). May need to repeat course if not completely healed. Please let us know in 6 weeks if not better (Patient taking differently: as needed. Use 2 times a day to affected area until healed (probably 7-10 days). May need to repeat course if not completely healed. Please let us know in 6 weeks if not better) 15 g 1     naproxen (NAPROSYN) 500 MG tablet as needed.       norelgestromin-ethinyl estradiol (ORTHO EVRA) 150-35 MCG/24HR patch Remove old patch and apply new patch onto the skin once a week for 3 weeks (21 days). Do not wear patch week 4 (days 22-28), then repeat. 9 patch 3     tacrolimus (PROTOPIC) 0.1 % external ointment Apply topically 2 times daily. (Patient taking differently: Apply topically as needed.) 60 g 1     No current facility-administered medications for this visit.      Past Medical History:   Patient Active Problem List   Diagnosis     Prognathism     Angular cheilitis     TERESITA (generalized anxiety disorder)     Congenital anteversion of both femurs     Pelvic pain in female     Multiple joint pain     Seborrheic dermatitis     TMJ tenderness, right     TMJ (temporomandibular joint syndrome)     Hypermobility syndrome     Mixed anxiety and depressive disorder     Generalized anxiety disorder     Other specified depressive episodes     Past Medical History:   Diagnosis Date     ADHD (attention deficit hyperactivity disorder)      Angular cheilitis     Follows with dermatology     Congenital anteversion of both femurs      Depressive disorder 2017     TERESITA (generalized anxiety disorder)      Prognathism        CC Dee Alex MD  [address] [place] [phone] on close of this encounter.      Again, thank you for allowing me to participate in the care of your patient.      Sincerely,    Dee Mota  MD Isai

## 2025-04-14 NOTE — PROGRESS NOTES
[facility] Dermatology Note  Encounter Date: Apr 14, 2025  Office Visit     Dermatology Problem List:  # lip dermatitis + angular cheilitis, favoring allergic contact; chronic, slow improvement  Current tx: tacrolimus + gentle skin care; previous: mupirocin, hydrocortisone, keto  # seb derm --> H&S, keto cream PRN  # atopic history (hay fever, allergic to grass, KP)  ____________________________________________    Assessment & Plan:   # lip dermatitis + angular cheilitis, favoring allergic contact; chronic, slow improvement  # atopic history (hay fever, allergic to grass, KP)  Refractory to mupirocin, topical antifungals (ketoconazole), hydrocortisone, gentle skin care. Some improvement with tacrolimus, but will try to identify the trigger (lip stick, oral allergen (toothpaste?), airborne?)  - continue topical tacrolimus BID   - OK to continue Vaseline, sun protection  - Dermato-allergy referral    # left underarm dermatitis, subacute; asymptomatic  Slight erythematous plaque, ACD versus early GA? Will CTM. Biopsy if significantly worsening.   - START tacrolimus BID until resolved    # seborrheic dermatitis, chronic, well controlled.   - ketoconazole 2% bid prn application to rash areas.  - Head&Shoulders shampoo as face wash at least twice a week as tolerated.    # right great toe dystrophy  Ddx traumatic injury versus fungus  Pending toenail clipping. Would then consider terbinafine versus a topical ciclopirox (discussed these at length today).     Procedures Performed: None    Follow-up: in dermato-allergy clinic, prn for new or changing lesions    Staff and Resident:     Staffed with Dr. Alex.     Edgar Sung MD  Dermatology Resident  I, Dee Alex MD, saw this patient with the resident and agree with the resident s findings and plan of care as documented in the resident s note.    ____________________________________________    CC: Derm Problem (PT reports that it ''goes well for  yassine'' reports that the corners have improved but the surface of lips will ebb and flow. Better than last visit but not back to normal)    HPI:  Ms. Bria Hamilton is a(n) 25 year old female who presents today as a return patient for angular cheilitis. First started in 2021. Now the best it has been after using the tacrolimus.     Currently using:  Vanicream  Sunscreen  Tacrolimus   CeraVe cleanser    Allergy testing for seasonal allergies recently found an allergy to grass. Using antihistamines, don't really help with the lips much. Recently traveled to [place] with a ton of pollen, and lips were worse. Mom with history of skin lupus. Family history of atopy. Patient is otherwise feeling well, without additional skin concerns.    Labs Reviewed:  N/A    Physical Exam:  Vitals: LMP 03/23/2025 (Exact Date)   SKIN: Focused examination of face, lips, and left under arm was performed.  - Eczematous plaques on the bilateral upper and lower lips, improved from prior  - Subtle lunate red dermatitis plaque under the left upper arm  - No other lesions of concern on areas examined.     Medications:  Current Outpatient Medications   Medication Sig Dispense Refill    amphetamine-dextroamphetamine (ADDERALL XR) 10 MG 24 hr capsule Take 10 mg by mouth daily      amphetamine-dextroamphetamine (ADDERALL) 5 MG tablet as needed.      azelastine (OPTIVAR) 0.05 % ophthalmic solution Place 1 drop into both eyes 2 times daily as needed (for itchy eyes). 6 mL 11    escitalopram (LEXAPRO) 20 MG tablet Take 20 mg by mouth daily.      ketoconazole (NIZORAL) 2 % external cream Apply topically daily (Patient taking differently: Apply topically as needed.) 60 g 11    mupirocin (BACTROBAN) 2 % external ointment Use 2 times a day to affected area until healed (probably 7-10 days). May need to repeat course if not completely healed. Please let us know in 6 weeks if not better (Patient taking differently: as needed. Use 2 times a day to  affected area until healed (probably 7-10 days). May need to repeat course if not completely healed. Please let us know in 6 weeks if not better) 15 g 1    naproxen (NAPROSYN) 500 MG tablet as needed.      norelgestromin-ethinyl estradiol (ORTHO EVRA) 150-35 MCG/24HR patch Remove old patch and apply new patch onto the skin once a week for 3 weeks (21 days). Do not wear patch week 4 (days 22-28), then repeat. 9 patch 3    tacrolimus (PROTOPIC) 0.1 % external ointment Apply topically 2 times daily. (Patient taking differently: Apply topically as needed.) 60 g 1     No current facility-administered medications for this visit.      Past Medical History:   Patient Active Problem List   Diagnosis    Prognathism    Angular cheilitis    TERESITA (generalized anxiety disorder)    Congenital anteversion of both femurs    Pelvic pain in female    Multiple joint pain    Seborrheic dermatitis    TMJ tenderness, right    TMJ (temporomandibular joint syndrome)    Hypermobility syndrome    Mixed anxiety and depressive disorder    Generalized anxiety disorder    Other specified depressive episodes     Past Medical History:   Diagnosis Date    ADHD (attention deficit hyperactivity disorder)     Angular cheilitis     Follows with dermatology    Congenital anteversion of both femurs     Depressive disorder 2017    TERESITA (generalized anxiety disorder)     Prognathism        CC Dee Alex MD  [address] [facility] [address]  [place] [address] on close of this encounter.

## 2025-04-15 ENCOUNTER — THERAPY VISIT (OUTPATIENT)
Dept: PHYSICAL THERAPY | Facility: CLINIC | Age: 25
End: 2025-04-15
Payer: COMMERCIAL

## 2025-04-15 DIAGNOSIS — M26.609 TMJ (TEMPOROMANDIBULAR JOINT SYNDROME): ICD-10-CM

## 2025-04-15 DIAGNOSIS — M26.621 TMJ TENDERNESS, RIGHT: Primary | ICD-10-CM

## 2025-04-15 PROCEDURE — 97110 THERAPEUTIC EXERCISES: CPT | Mod: GP | Performed by: PHYSICAL THERAPIST

## 2025-04-15 PROCEDURE — 97140 MANUAL THERAPY 1/> REGIONS: CPT | Mod: GP | Performed by: PHYSICAL THERAPIST

## 2025-04-15 NOTE — PROGRESS NOTES
DISCHARGE  Reason for Discharge: Patient has met goals and feels ready to work on HEP independently to self-manage jaw pain.    Equipment Issued: none    Discharge Plan: Patient to continue home program.     04/15/25 0500   Appointment Info   Signing clinician's name / credentials Annalee Melendez, DPT, Cert. MDT   Total/Authorized Visits 6-8 ([employer])   Visits Used 4   Medical Diagnosis TMJ tenderness, right   PT Tx Diagnosis TMJ   Progress Note/Certification   Onset of illness/injury or Date of Surgery 03/01/24   Therapy Frequency 2-3x/month   Predicted Duration 2-3 months   Progress Note Completed Date 04/15/25   GOALS   PT Goals 2   PT Goal 1   Goal Identifier chewing   Goal Description Pt will be able to chew harder food with 3/10 concordant jaw pain for improved quality of  nutrtion.   Goal Progress pt reports that she hasn't gottem much popping or pain anymore - improved   Target Date 04/29/25   Date Met 04/15/25   PT Goal 2   Goal Identifier yawning   Goal Description Pt will be able to yawn with 3/10 concordant jaw pain.   Rationale to maximize safety and independence with self cares;to maximize safety and independence with performance of ADLs and functional tasks   Goal Progress still is modifying - she has noticed significantly less clicking/popping   Target Date 05/13/25   Subjective Report   Subjective Report Pt reports that her jaw has been feeling a little bit better - she hasn't noticed as much popping/clicking with eating.   Objective Measures   Objective Measures Objective Measure 1;Objective Measure 2   Objective Measure 1   Objective Measure MMT TMJ   Details B lateral deviation 5/5 (+ at R with testing of L deviation and pain at L with testing of R deviation), opening 5/5 (-), closing 5/5 (-)   Objective Measure 2   Objective Measure TMJ ROM   Details opening 40 (mild pain), B deviation 10   Treatment Interventions (PT)   Interventions Therapeutic Procedure/Exercise;Manual Therapy   Therapeutic  Procedure/Exercise   Therapeutic Procedures: strength, endurance, ROM, flexibility minutes (64502) 15   Therapeutic Procedures Ther Proc 2;Ther Proc 3;Ther Proc 4;Ther Proc 5   Ther Proc 1 seated cervical retraction   Ther Proc 1 - Details rev x 5 reps - pt really likes this - continue for HEP w/self OP at C7T1 junction   Ther Proc 2 neutral jaw   Ther Proc 2 - Details verbal rev - continue for HEP   Ther Proc 3 TMJ rythnic stabilization   Ther Proc 3 - Details verbal revx -with opening her mouth midway in all directions for HEP. Recommended doing this 4-6 times/day   Ther Proc 4 scap depression   Ther Proc 4 - Details HEP as tolerated- postural exercise to reduce tension at B UT - pt already does this intermittently during the day   Ther Proc 5 supine cervical retraction   Ther Proc 5 - Details verbal rev - HEP   Skilled Intervention independent with HEP   Patient Response/Progress no pain   Manual Therapy   Manual Therapy: Mobilization, MFR, MLD, friction massage minutes (42085) 20   Manual Therapy Manual Therapy 2;Manual Therapy 3   Manual Therapy 1 STM   Manual Therapy 1 - Details in supine along/under lateral just to ear, B scalenes, B TMJ x 20'   Skilled Intervention manual technique   Patient Response/Progress improved neck/jaw pain   Education   Learner/Method No Barriers to Learning   Plan   Home program gave pt handout of HEP   Plan for next session d/c today   Total Session Time   Timed Code Treatment Minutes 35   Total Treatment Time (sum of timed and untimed services) 35         Referring Provider:  Celena Rivera

## 2025-04-16 ENCOUNTER — TELEPHONE (OUTPATIENT)
Dept: FAMILY MEDICINE | Facility: CLINIC | Age: 25
End: 2025-04-16
Payer: COMMERCIAL

## 2025-04-16 DIAGNOSIS — R68.84 JAW PAIN: Primary | ICD-10-CM

## 2025-04-16 NOTE — TELEPHONE ENCOUNTER
Referral placed for speech therapy as recommended by physical therapy. [employer] 1 message sent to patient.   Celena Rivera PA-C on 4/16/2025 at 10:29 AM

## 2025-04-16 NOTE — TELEPHONE ENCOUNTER
----- Message from Annalee Melendez sent at 4/15/2025  2:52 PM CDT -----  Regarding: add order for speech therapy  Hello,    Thank you for referring this patient for PT.  She is doing well and we have completed PT for now.  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  Would that be ok with you?  If so, could you put an order in for this?  Thank you so much!           Annalee Melendez, PT

## 2025-04-20 PROBLEM — R10.2 PELVIC PAIN IN FEMALE: Status: RESOLVED | Noted: 2024-05-25 | Resolved: 2025-04-20

## 2025-04-21 LAB
PATH REPORT.COMMENTS IMP SPEC: NORMAL
PATH REPORT.FINAL DX SPEC: NORMAL
PATH REPORT.GROSS SPEC: NORMAL
PATH REPORT.MICROSCOPIC SPEC OTHER STN: NORMAL
PATH REPORT.RELEVANT HX SPEC: NORMAL

## 2025-04-22 ENCOUNTER — MYC MEDICAL ADVICE (OUTPATIENT)
Dept: DERMATOLOGY | Facility: CLINIC | Age: 25
End: 2025-04-22
Payer: COMMERCIAL

## 2025-04-23 NOTE — TELEPHONE ENCOUNTER
FOV: 4/14/25    Assessment & Plan:   # lip dermatitis + angular cheilitis, favoring allergic contact; chronic, slow improvement  # atopic history (hay fever, allergic to grass, KP)  Refractory to mupirocin, topical antifungals (ketoconazole), hydrocortisone, gentle skin care. Some improvement with tacrolimus, but will try to identify the trigger (lip stick, oral allergen (toothpaste?), airborne?)  - continue topical tacrolimus BID   - OK to continue Vaseline, sun protection  - Dermato-allergy referral     # left underarm dermatitis, subacute; asymptomatic  Slight erythematous plaque, ACD versus early GA? Will CTM. Biopsy if significantly worsening.   - START tacrolimus BID until resolved     # seborrheic dermatitis, chronic, well controlled.   - ketoconazole 2% bid prn application to rash areas.  - Head&Shoulders shampoo as face wash at least twice a week as tolerated.     # right great toe dystrophy  Ddx traumatic injury versus fungus  Pending toenail clipping. Would then consider terbinafine versus a topical ciclopirox (discussed these at length today).

## 2025-04-24 NOTE — PROGRESS NOTES
"[facility] Dermatology Note  Encounter Date: May 1, 2025  Date of Last Dermatology Office Visit: 4/14/2025   ***N/A (Patient new to dermatology clinic)    Dermatology Problem List:  #. ***    ____________________________________________    Assessment & Plan:     # ***.  {kkplans:116599}   - ***     # ***.   {kkplans:550490}   - ***     Procedures Performed: {NONE:695388}  {kkprocedurenotes:038577}  {kkprocedurenotes:342307}  {kkprocedurenotes:699756}    RTC: {kkfollowup:062758}    Staff and Resident:     Staff: {[facility] Dermatology Attending Physician:[id number]}    Marina Vyas MD  PGY-5, Internal Medicine-Dermatology  Pager: [phone]  ____________________________________________    CC: No chief complaint on file.      HPI:  Ms. Bria Hamilton is a(n) 25 year old female who presents today as a new patient for the following chief complaint(s):       \"I hope you are doing well. Today during my lunch at work I sat out in my car for about 30 mins with the A/C on. I noticed that my face felt hot and parts of it felt slightly raised. The photos attached show my face now, 8+ hours ago and after washing my face and not applying anything to my skin.      My mom s skin lupus started showing up in this same way before consistently getting worse and her getting diagnosed. Would it be worth it to get a skin biopsy done to rule out connective tissue disorders? Other than my mom, I do have a family history of connective tissue disorders.\"    Describes two prior episodes following sun exposure   Rash stayed for 8 hours and then dissapeared  Symptoms including itching  Less than a year  Other stymptoms include joint pain  Notable history of allergies     Strong family history of autoimmune disease       -{DENIES:198269} personal or immediate family history of skin cancer.   -{DENIES:875068} occasional (though not frequent) history of peeling burns in childhood/adolescence.   -{DENIES:398436} any smoking or " "other tobacco use history.   -{DENIES:480595} any history of tanning bed use.     Patient is otherwise feeling well, without additional skin concerns.      Labs Reviewed:  ***  ***Not applicable to this visit    Physical Exam:  Vitals: LMP 03/23/2025 (Exact Date)   General: Well developed adult. Resting comfortably; no acute distress. Conversational and cooperative with exam.  HEENT: Anicteric sclera. EOMI. Mucous membranes moist.   Skin Exam: {YORODERMPHYSICALEXA:011313::\"A total skin exam, excluding the genitalia and female breasts (if present), was performed: this included the head/face and neck, upper extremities, chest, back, abdomen, lower extremities, and buttocks.  Patient consent was confirmed before performing sensitive portions of the exam. \"}  - {YOROFITZ:604013::\"Mccarthy Type II: Sun-occluded skin with minimal background pigmentation. Slightly tanned in sun-exposed areas.\"}.   - Patient with {GREATER/LESS THAN:278836} than 100 nevi  - {Skin Exam Derm:387342}.   - {Skin Exam Derm:491279}.   - No other lesions of concern on areas examined.   Psych: Appropriate mood and affect for situation.    Medications: Reviewed in epic    Past Medical History:   Patient Active Problem List   Diagnosis    Prognathism    Angular cheilitis    TERESITA (generalized anxiety disorder)    Congenital anteversion of both femurs    Multiple joint pain    Seborrheic dermatitis    TMJ tenderness, right    TMJ (temporomandibular joint syndrome)    Hypermobility syndrome    Mixed anxiety and depressive disorder    Generalized anxiety disorder    Other specified depressive episodes     Past Medical History:   Diagnosis Date    ADHD (attention deficit hyperactivity disorder)     Angular cheilitis     Follows with dermatology    Congenital anteversion of both femurs     Depressive disorder 2017    TERESITA (generalized anxiety disorder)     Prognathism        CC No referring provider defined for this encounter. on close of this " encounter.

## 2025-05-01 ENCOUNTER — OFFICE VISIT (OUTPATIENT)
Dept: DERMATOLOGY | Facility: CLINIC | Age: 25
End: 2025-05-01
Payer: COMMERCIAL

## 2025-05-01 ENCOUNTER — LAB (OUTPATIENT)
Dept: LAB | Facility: CLINIC | Age: 25
End: 2025-05-01
Payer: COMMERCIAL

## 2025-05-01 DIAGNOSIS — L56.3 SOLAR URTICARIA: ICD-10-CM

## 2025-05-01 DIAGNOSIS — Z84.0 FAMILY HISTORY OF CUTANEOUS LUPUS: ICD-10-CM

## 2025-05-01 DIAGNOSIS — L30.9 DERMATITIS: ICD-10-CM

## 2025-05-01 DIAGNOSIS — Z84.0 FAMILY HISTORY OF CUTANEOUS LUPUS: Primary | ICD-10-CM

## 2025-05-01 PROCEDURE — 86235 NUCLEAR ANTIGEN ANTIBODY: CPT | Performed by: DERMATOLOGY

## 2025-05-01 PROCEDURE — 86038 ANTINUCLEAR ANTIBODIES: CPT | Performed by: DERMATOLOGY

## 2025-05-01 PROCEDURE — 1126F AMNT PAIN NOTED NONE PRSNT: CPT | Performed by: DERMATOLOGY

## 2025-05-01 PROCEDURE — 99214 OFFICE O/P EST MOD 30 MIN: CPT | Mod: GC | Performed by: DERMATOLOGY

## 2025-05-01 PROCEDURE — 99000 SPECIMEN HANDLING OFFICE-LAB: CPT | Performed by: PATHOLOGY

## 2025-05-01 RX ORDER — HYDROCORTISONE 25 MG/G
OINTMENT TOPICAL
Qty: 30 G | Refills: 1 | Status: SHIPPED | OUTPATIENT
Start: 2025-05-01

## 2025-05-01 ASSESSMENT — PAIN SCALES - GENERAL: PAINLEVEL_OUTOF10: NO PAIN (0)

## 2025-05-01 NOTE — LETTER
5/1/2025       RE: Bria Hamilton  [address] [place] [id number]     Dear Colleague,    Thank you for referring your patient, Bria Hamilton, to the [facility] [place] [facility]. Please see a copy of my visit note below.    [facility] Dermatology Note  Encounter Date: May 1, 2025  Date of Last Dermatology Office Visit: 4/14/2025       Dermatology Problem List:  #. Photosensitive rash of the face  Ddx includes PMLE, solar urticaria, less likely discoid lupus (family history)   # Dermatitis of the lips + angular cheilitis, favoring allergic contact; chronic, slow improvement  Current tx: tacrolimus + gentle skin care; previous: mupirocin, hydrocortisone, keto  # Seb derm --> H&S, keto cream PRN  # Atopic history (hay fever, allergic to grass, KP)  ____________________________________________    Assessment & Plan:     #. Photosensitive rash of the face  Ddx includes PMLE, solar urticaria, less likely discoid lupus (family history)   Overall clinical presentation not concerning for lupus, however given possible involvement of other organ systems (joint pain and pleurisy) in the setting of photosensitive eruption, prudent to screen. Start treatment with brief course of hydrocortisone and histamines if flare occurs again.   - hydrocortisone 2.5 % ointment; Apply thin layer twice daily until rash resolved. Contact your doctor if needed for more than 7 days in a row.   - SSA Ro YAMILETH Antibody IgG; Future  - SSB La YAMILETH Antibody IgG; Future  - Anti Nuclear Rachna IgG by IFA with Reflex; Future      Procedures Performed: None      RTC: pending lab results    Staff and Resident:     Staff: Dr. Boogie Vyas MD  PGY-5, Internal Medicine-Dermatology  Pager: [phone]    I have personally examined this patient and agree with the resident doctor's documentation and plan of care. I have reviewed and  "amended the resident's note above. The documentation accurately reflects my clinical observations, diagnoses, treatment and follow-up plans.     Jenny Hyman MD  Dermatology Staff    ____________________________________________    CC: No chief complaint on file.      HPI:  Ms. Bria Hamilton is a(n) 25 year old female who presents today as a new patient for the following chief complaint(s):     \"I hope you are doing well. Today during my lunch at work I sat out in my car for about 30 mins with the A/C on. I noticed that my face felt hot and parts of it felt slightly raised. The photos attached show my face now, 8+ hours ago and after washing my face and not applying anything to my skin.      My mom s skin lupus started showing up in this same way before consistently getting worse and her getting diagnosed. Would it be worth it to get a skin biopsy done to rule out connective tissue disorders? Other than my mom, I do have a family history of connective tissue disorders.\"    -Describes two prior episodes following sun exposure as above  -Rash stayed for 8-12 hours and then dissapeared  -Symptoms including itching  -Overall duration has been less than a year  -Other stymptoms include joint pain which she goes to hand OT for currently, as well as possible pleuisy (diagnosed in ED as costochondritis).  -Notable history of allergies     Strong family history of autoimmune disease with \"skin lupus\" (discoid lupus on review) in her mother and psoriatic arthritis in her aunt.     Patient is otherwise feeling well, without additional skin concerns.      Labs Reviewed:    Not applicable to this visit    Physical Exam:  Vitals: LMP 03/23/2025 (Exact Date)   General: Well developed adult. Resting comfortably; no acute distress. Conversational and cooperative with exam.  HEENT: Anicteric sclera. EOMI. Mucous membranes moist.   Skin Exam: A focused exam of the face and hands was performed.   - Mccarthy Type I: " Sun-occluded skin with minimal background pigmentation. Slightly pink..   - Patient with less than 100 nevi  - Mild background erythema of the bilateral cheeks.   - Photos reviewed which show small smooth dermal erythematous plaques in photo-distributed areas on the face.   - No other lesions of concern on areas examined.   Psych: Appropriate mood and affect for situation.    Medications: Reviewed in epic    Past Medical History:   Patient Active Problem List   Diagnosis     Prognathism     Angular cheilitis     TERESITA (generalized anxiety disorder)     Congenital anteversion of both femurs     Multiple joint pain     Seborrheic dermatitis     TMJ tenderness, right     TMJ (temporomandibular joint syndrome)     Hypermobility syndrome     Mixed anxiety and depressive disorder     Generalized anxiety disorder     Other specified depressive episodes     Past Medical History:   Diagnosis Date     ADHD (attention deficit hyperactivity disorder)      Angular cheilitis     Follows with dermatology     Congenital anteversion of both femurs      Depressive disorder 2017     TERESITA (generalized anxiety disorder)      Prognathism        CC No referring provider defined for this encounter. on close of this encounter.      Again, thank you for allowing me to participate in the care of your patient.      Sincerely,    Marian Vyas MD

## 2025-05-01 NOTE — NURSING NOTE
Dermatology Rooming Note    Bria Hamilton's goals for this visit include:   Chief Complaint   Patient presents with    Derm Problem     Patient reports sitting in her car with AC on and her face got extremely red. Reports a family history of skin lupus.      Earle Fong, EMT  Clinic Support  [facility]     [phone]    Employed by [employer]

## 2025-05-01 NOTE — PATIENT INSTRUCTIONS
"      Sun Protection    Recommend sunscreen (more brands also below):  - ISMARIBEL Eryfotona Actinica Mineral Sunscreen SPF 50+ Zinc Oxide  - ISMARIBEL Minear Brush SPF 50 (for re-application throughout the day)       Sunscreen   What does \"broad spectrum mean\"?  Broad spectrum sunscreens protect against both UVA and UVB radiation. UVC is filtered out by the ozone layer.     What does SPF mean?   SPF stands for  Sun Protection Factor  and represents the ability to screen only UVB (burning) rays. UVB rays are mostly blocked in all sunscreens, but only those that contain titanium dioxide, zinc oxide, mexoryl or Parsol 1789 (avobenzone) block the UVA spectrum. Even though a sunscreen is labeled  UVA/UVB Protection  that is not entirely accurate because products that only partially protect against UVA can claim to protect against both UVA and UVB.     What SPF should I chose?   Aim to get a sunscreen that is at least sun protection factor (SPF) 30. SPF 15 provides about 92-93% coverage, SPF 30 about 95-97% coverage, and SPF 45 about 98% coverage. That is to say, SPF 30 is not twice as good as SPF 15. The reason why we recommend SPF 30 is because we are usually only putting on half the necessary amount of sunscreen to achieve the advertised protection. That means that it is very possible that your SPF 30 sunscreen is only providing you with SPF 15 coverage based on how much you are applying. SPF 15 (92-93% coverage) is the absolute minimal that we recommend. Similarly, the benefit of sunscreens with SPF higher than 50 is that even if you put on less than the required amount, you are likely still getting good protection (ex: even if you apply only half the recommended amount of , it should still provide you with an SPF of 50).     How much should I apply?  If covering your whole body, you should be using 30 grams, or one ounce, which is how much is in one shot glass! That s a THICK layer! May times, you are only applying " half the recommended amount, which means that you are only getting half the SPF (for example, you may be using SPF 30 but if you're only applying half the recommended amount, you're only getting SPF 15 protection.      When do I need to wear sunscreen?  Every day, rain or shine! Even on a rainy day or a day when you are only indoors, you are still being exposed harmful UV radiation from the sun. We usually recommend physical/mineral sunscreens (active ingredient is titanium dioxide or zinc oxide) as these ingredients have been around for many years, there is no concern of them being absorbed into the bloodstream, and they are coral reef friendly! However, the best sunscreen is the one that you will use everyday.     What about my kids?  Sunscreen is not recommended for infants under the age of 6 months. Use clothing, shade and sun avoidance for small infants. For kids older than 6 months, we recommend that you should use only mineral/physical sunscreens that have zinc oxide as the active ingredient. Sun-protective clothing and hats are also important for people of all ages.     Sun protective clothing and Resources   Coolibar (www.coolibar.RFID Global Solution)  Clinverse (wwwSapheon)  Athleta (www"Hero Network, Inc.")  ELVPHD (wwwOne2start)  Carve Designs (Sentons) - affordable  Skinz (uvskinz.com)    Long sleeve - Se Cool DRI UPF 50 or Dameron PFG UPF 50  Foxie - Dameron PFG UPF 50  Swimshirt/Rash Guard - Monisha UPF 50 (on Amazon)  Neck - Outdoor Research Ubertubes (www.outdoorresearch.RFID Global Solution)      Do I need tinted sunscreen?  There is more and more research showing that visible light can also lead to discoloration (such as melasma). Tinted sunscreens (which contain iron oxides) protect against visible light as an added bonus.     What brands do you recommend?  Physical/Mineral Sunscreens (in no particular order)  Elta MD UV Physical Broad-Spectrum SPF 41 Sunscreen (Tinted, $33)  Skin Ceuticals Physical Fusion  "UV Defense SPF 50 (Tinted, $34)  Unsun Mineral Tinted Face Sunscreen (Tinted, with 2 shade ranges, $29)  It Cosmetics CC+ Cream with SPF 50+ (Tinted, can also double as foundation/coverage -- great range of shades, $40)  Biossance Squalane + Zinc Sheer Mineral Sunscreen SPF 30 PA +++ (goes on white then blends in, $30)  Cerave 100% Mineral Sunscreen SPF 50 Face (good for sensitive skin, $15)  La Roche Posay Anthelios Mineral Zinc Oxide Sunscreen SPF 50 ($35)  La Roche Posay Anthelios Mineral Tinted Sunscreen for Face SPF 50 ($35)  Think Sport Sunscreen (great for sports, though has more of a white cast, $20)  Think Baby Sunscreen (for kids, $21)  Color Science Sunforgettable Total Protection Brush On Shield SPF 50 (Multiple tints, $130)    Chemical Sunscreens  Chayo Rouleau Weightless Protection SPF 30 ($48)  Kristal SPF Brightening Moisturizer ($30)  Urban Skin Complexion Protection Moisturizer SPF 30 ($20)  Total Defense + Repair Broad Spectrum SPF 34 ($68)  Clarins UV PLUS Anti-Pollution Sunscreen Multi-Protection Tint SPF 50 (Multiple tints, $45)  Neutrogena Healthy Skin Glow Sheers Tinted Moisturizer with SPF 20 (Multiple tints, $11)    The ABCDEs of Melanoma  Skin cancer can develop anywhere on the skin. Once a month, take a look at your entire body and note any changing moles or spots. Ask someone for help when checking your skin, especially for hard to see places such as your back. If you notice a mole that looks different from others, or one that changes, enlarges, itches, or bleeds, you should see a dermatologist.    Asymmetry, Border (irregularity), Color (not uniform, changes in color), Diameter (greater than 6 mm which is about the size of a pencil eraser), and Evolving (any changes in pre-existing moles). In short, look for the \"ugly duckling.\" You want all of the spots on your body to look like cousins (like they could be related). If something stands out, take a photo of it and make an appointment to " have it evaluated.     Suggested supplement:   - Heliocare - claims to maintain the skin's ability to protect itself against sun-related effects and aging.   - Not a replacement for sunscreen! Should be used in addition to sunscreen and sun protective measures such as hats, etc.  - Usually available online and at major retailers such as Drivable, etc.

## 2025-05-05 ENCOUNTER — MYC MEDICAL ADVICE (OUTPATIENT)
Dept: DERMATOLOGY | Facility: CLINIC | Age: 25
End: 2025-05-05
Payer: COMMERCIAL

## 2025-05-05 DIAGNOSIS — R21 MALAR RASH: Primary | ICD-10-CM

## 2025-05-07 ENCOUNTER — LAB (OUTPATIENT)
Dept: LAB | Facility: CLINIC | Age: 25
End: 2025-05-07
Payer: COMMERCIAL

## 2025-05-07 DIAGNOSIS — Z11.3 SCREENING FOR STDS (SEXUALLY TRANSMITTED DISEASES): Primary | ICD-10-CM

## 2025-05-07 DIAGNOSIS — R21 MALAR RASH: ICD-10-CM

## 2025-05-07 LAB
ALBUMIN UR-MCNC: NEGATIVE MG/DL
APPEARANCE UR: CLEAR
BACTERIA #/AREA URNS HPF: ABNORMAL /HPF
BILIRUB UR QL STRIP: NEGATIVE
COLOR UR AUTO: YELLOW
ERYTHROCYTE [SEDIMENTATION RATE] IN BLOOD BY WESTERGREN METHOD: 13 MM/HR (ref 0–20)
GLUCOSE UR STRIP-MCNC: NEGATIVE MG/DL
HGB UR QL STRIP: NEGATIVE
KETONES UR STRIP-MCNC: NEGATIVE MG/DL
LEUKOCYTE ESTERASE UR QL STRIP: NEGATIVE
NITRATE UR QL: NEGATIVE
PH UR STRIP: 6 [PH] (ref 5–7)
RBC #/AREA URNS AUTO: ABNORMAL /HPF
SP GR UR STRIP: 1.01 (ref 1–1.03)
SQUAMOUS #/AREA URNS AUTO: ABNORMAL /LPF
UROBILINOGEN UR STRIP-ACNC: 0.2 E.U./DL
WBC #/AREA URNS AUTO: ABNORMAL /HPF

## 2025-05-07 PROCEDURE — 80048 BASIC METABOLIC PNL TOTAL CA: CPT

## 2025-05-07 PROCEDURE — 86140 C-REACTIVE PROTEIN: CPT

## 2025-05-07 PROCEDURE — 81001 URINALYSIS AUTO W/SCOPE: CPT

## 2025-05-07 PROCEDURE — 99000 SPECIMEN HANDLING OFFICE-LAB: CPT

## 2025-05-07 PROCEDURE — 85652 RBC SED RATE AUTOMATED: CPT

## 2025-05-07 PROCEDURE — 82550 ASSAY OF CK (CPK): CPT

## 2025-05-07 PROCEDURE — 82085 ASSAY OF ALDOLASE: CPT | Mod: 90

## 2025-05-07 PROCEDURE — 36415 COLL VENOUS BLD VENIPUNCTURE: CPT

## 2025-05-08 LAB
ANION GAP SERPL CALCULATED.3IONS-SCNC: 10 MMOL/L (ref 7–15)
BUN SERPL-MCNC: 8.4 MG/DL (ref 6–20)
CALCIUM SERPL-MCNC: 8.9 MG/DL (ref 8.8–10.4)
CHLORIDE SERPL-SCNC: 103 MMOL/L (ref 98–107)
CK SERPL-CCNC: 211 U/L (ref 26–192)
CREAT SERPL-MCNC: 0.79 MG/DL (ref 0.51–0.95)
CRP SERPL-MCNC: 4.88 MG/L
EGFRCR SERPLBLD CKD-EPI 2021: >90 ML/MIN/1.73M2
GLUCOSE SERPL-MCNC: 91 MG/DL (ref 70–99)
HCO3 SERPL-SCNC: 26 MMOL/L (ref 22–29)
POTASSIUM SERPL-SCNC: 3.9 MMOL/L (ref 3.4–5.3)
SODIUM SERPL-SCNC: 139 MMOL/L (ref 135–145)

## 2025-05-12 ENCOUNTER — OFFICE VISIT (OUTPATIENT)
Dept: FAMILY MEDICINE | Facility: CLINIC | Age: 25
End: 2025-05-12
Payer: COMMERCIAL

## 2025-05-12 VITALS
SYSTOLIC BLOOD PRESSURE: 111 MMHG | HEIGHT: 69 IN | OXYGEN SATURATION: 98 % | BODY MASS INDEX: 23.71 KG/M2 | WEIGHT: 160.1 LBS | TEMPERATURE: 99.4 F | RESPIRATION RATE: 14 BRPM | HEART RATE: 91 BPM | DIASTOLIC BLOOD PRESSURE: 73 MMHG

## 2025-05-12 DIAGNOSIS — R42 ORTHOSTATIC LIGHTHEADEDNESS: ICD-10-CM

## 2025-05-12 DIAGNOSIS — R00.0 TACHYCARDIA: Primary | ICD-10-CM

## 2025-05-12 PROCEDURE — 3078F DIAST BP <80 MM HG: CPT | Performed by: PHYSICIAN ASSISTANT

## 2025-05-12 PROCEDURE — 3074F SYST BP LT 130 MM HG: CPT | Performed by: PHYSICIAN ASSISTANT

## 2025-05-12 PROCEDURE — 1126F AMNT PAIN NOTED NONE PRSNT: CPT | Performed by: PHYSICIAN ASSISTANT

## 2025-05-12 PROCEDURE — 99213 OFFICE O/P EST LOW 20 MIN: CPT | Performed by: PHYSICIAN ASSISTANT

## 2025-05-12 ASSESSMENT — PAIN SCALES - GENERAL: PAINLEVEL_OUTOF10: NO PAIN (0)

## 2025-05-12 NOTE — PROGRESS NOTES
"  Assessment & Plan     Tachycardia  Orthostatic lightheadedness  Reviewed zio patch results and recent labs. Consider potential POTS based on symptoms. Will refer to cardiology for eval. Discussed good hydration and cautious position change.   - Adult Cardiology Eval  Referral    Celena Rivera PA-C on 5/12/2025 at 2:42 PM        Priti Fraser is a 25 year old, presenting for the following health issues:  Follow Up (Patient recently wore Zio patch for 7 days. )        5/12/2025     2:22 PM   Additional Questions   Roomed by Isa CARBALLO     History of Present Illness       Reason for visit:  To discuss results of heart monitor   She is taking medications regularly.      Patient presents today to follow up with recent ZIO patch monitor findings  Was seen for palpitations and zio patch ordered  HR  with avg 89, predominantly sinus   Symptoms correlated with sinus rhythm   TSH normal  Saw derm - being worked up for cutaneous lupus - mother has this  Notes intolerance to position change worse in the morning  Also notes her heart rate gets pretty high per her apple watch without any strenuous activity         Objective    /73   Pulse 91   Temp 99.4  F (37.4  C) (Tympanic)   Resp 14   Ht 1.746 m (5' 8.74\")   Wt 72.6 kg (160 lb 1.6 oz)   LMP 04/19/2025 (Exact Date)   SpO2 98%   BMI 23.82 kg/m    Body mass index is 23.82 kg/m .  Physical Exam   GENERAL: alert and no distress  RESP: lungs clear to auscultation - no rales, rhonchi or wheezes  CV: regular rate and rhythm, normal S1 S2, no S3 or S4, no murmur, click or rub, no peripheral edema  NEURO: Normal strength and tone, mentation intact and speech normal  PSYCH: mentation appears normal, affect normal/bright          Signed Electronically by: Celena Rivera PA-C    "

## 2025-05-13 ENCOUNTER — RESULTS FOLLOW-UP (OUTPATIENT)
Dept: DERMATOLOGY | Facility: CLINIC | Age: 25
End: 2025-05-13

## 2025-05-13 ENCOUNTER — PATIENT OUTREACH (OUTPATIENT)
Dept: CARE COORDINATION | Facility: CLINIC | Age: 25
End: 2025-05-13
Payer: COMMERCIAL

## 2025-05-14 LAB
DSDNA AB SER-ACNC: 7.3 IU/ML
ENA SM IGG SER IA-ACNC: <0.7 U/ML
ENA SM IGG SER IA-ACNC: <0.7 U/ML
ENA SM IGG SER IA-ACNC: NEGATIVE
ENA SM IGG SER IA-ACNC: NEGATIVE
ENA SS-A AB SER IA-ACNC: 0.8 U/ML
ENA SS-A AB SER IA-ACNC: NEGATIVE
ENA SS-B IGG SER IA-ACNC: <0.6 U/ML
ENA SS-B IGG SER IA-ACNC: NEGATIVE
U1 SNRNP IGG SER IA-ACNC: 2.9 U/ML
U1 SNRNP IGG SER IA-ACNC: 2.9 U/ML
U1 SNRNP IGG SER IA-ACNC: NEGATIVE
U1 SNRNP IGG SER IA-ACNC: NEGATIVE

## 2025-05-15 ENCOUNTER — PATIENT OUTREACH (OUTPATIENT)
Dept: CARE COORDINATION | Facility: CLINIC | Age: 25
End: 2025-05-15
Payer: COMMERCIAL

## 2025-05-15 NOTE — RESULT ENCOUNTER NOTE
Broad workup negative for systemic lupus or other autoimmune connective tissue disease. Patient will be offered  biopsy if the rash worsens or fails to resolve with the treatment we've given to date - informed via [employer].     Staff cc'd as FYI only.

## 2025-06-21 ENCOUNTER — MYC MEDICAL ADVICE (OUTPATIENT)
Dept: DERMATOLOGY | Facility: CLINIC | Age: 25
End: 2025-06-21
Payer: COMMERCIAL

## 2025-06-26 ENCOUNTER — OFFICE VISIT (OUTPATIENT)
Dept: CARDIOLOGY | Facility: CLINIC | Age: 25
End: 2025-06-26
Payer: COMMERCIAL

## 2025-06-26 VITALS
SYSTOLIC BLOOD PRESSURE: 100 MMHG | HEART RATE: 98 BPM | HEIGHT: 68 IN | BODY MASS INDEX: 23.84 KG/M2 | DIASTOLIC BLOOD PRESSURE: 51 MMHG | WEIGHT: 157.3 LBS

## 2025-06-26 DIAGNOSIS — R00.0 TACHYCARDIA: Primary | ICD-10-CM

## 2025-06-26 DIAGNOSIS — R42 DIZZINESS: ICD-10-CM

## 2025-06-26 NOTE — PROGRESS NOTES
SERVICE DATE: June 26, 2025    PRIMARY CARDIOLOGY TEAM:       DIAGNOSES/ASSESSMENT:            PLAN:    Discussed encouraged progressive aerobic activities starting with declined aerobic activity such as swimming rowing and recumbent cycling to avoid symptom exacerbation.  Gradually increase intensity over 3 months to improve cardiovascular fitness and reduce symptoms.  Hydrate well with at least 80 ounces of fluid daily.  Increase dietary sodium intake to roughly 3000 mg/day.  Eat smaller, frequent meals low in refined carbohydrates to minimize postprandial symptoms.  Try compression socks to reduce leg blood pooling.  Practice muscle tensing, leg crossing or sit to stand maneuvers to stabilize blood pressure.  Avoid prolonged standing and rise slowly from sitting/lying positions.  At this stage, I do not recommend vasoconstrictor therapies such as midodrine, Valium expander such as fludrocortisone.  Can consider low-dose beta-blocker if tachycardia dominates.  Transthoracic echocardiogram.  If she has ongoing symptoms despite above, establish care with the neurology team with expertise in POTS syndrome.    Silverio Gore MD  Cardiology      REFERRING PROVIDER:  Celena Rivera PA-C  [address] [place] [phone]    REASON FOR VISIT:      HISTORY OF PRESENT ILLNESS:  Bria Hamilton is a 25 year old female, Bria, a 25-year-old female, is being evaluated for sinus tachycardia and orthostatic lightheadedness. She has a history of ADHD for which she takes amphetamine and dextroamphetamine. She is also on an oral contraceptive pill. Recent testing, including a renal panel, electrolytes, estimated GFR, TSH, CBC, and liver enzymes, were all normal. Her creatinine level was 0.79, and her estimated GFR was greater than 90. A Zyopatch Holter monitor was reviewed and showed normal sinus rhythm with rates between  BPM and less than 1% occasional ventricular ectopics, which correlated with  "patient-reported symptoms. Orthostatic vital signs taken today showed no significant changes, with supine BP at 109/72, standing BP at 122/86, supine pulse at 72 BPM, and standing pulse at 100 BPM. She was referred by her PCP to consider the possibility of POTS.    DIAGNOSES/ASSESSMENT:  1. Sinus tachycardia.  2. Orthostatic lightheadedness.  3. Possible POTS.    PLAN:  1. Continue monitoring heart rate and blood pressure.  2. Consider further evaluation for POTS.  3. Review and potentially adjust medications if necessary.          Physical exam: /51 (Patient Position: Standing)   Pulse 98   Ht 1.715 m (5' 7.5\")   Wt 71.4 kg (157 lb 4.8 oz)   LMP 04/19/2025 (Exact Date)   BMI 24.27 kg/m      New patient.  *** minutes spent by me on the date of the encounter doing chart review, history and exam, documentation and further activities per the note.    The longitudinal plan of care for the condition(s) below were addressed during this visit. Due to the added complexity in care, I will continue to support Bria in the subsequent management of this condition(s) and with the ongoing continuity of care of this condition(s).      Orders Placed This Encounter   Procedures    EKG 12-lead complete w/read - Clinics (performed today)         CURRENT MEDICATIONS:  Current Outpatient Medications   Medication Sig Dispense Refill    amphetamine-dextroamphetamine (ADDERALL XR) 10 MG 24 hr capsule Take 10 mg by mouth daily      amphetamine-dextroamphetamine (ADDERALL) 5 MG tablet as needed.      azelastine (OPTIVAR) 0.05 % ophthalmic solution Place 1 drop into both eyes 2 times daily as needed (for itchy eyes). 6 mL 11    Cetirizine HCl (ZYRTEC PO) Take by mouth 2 times daily.      escitalopram (LEXAPRO) 20 MG tablet Take 20 mg by mouth daily.      hydrocortisone 2.5 % ointment Apply thin layer twice daily until rash resolved. Contact your doctor if needed for more than 7 days in a row. 30 g 1    ketoconazole (NIZORAL) 2 % " external cream Apply topically daily 60 g 11    naproxen (NAPROSYN) 500 MG tablet as needed.      norethindrone-ethinyl estradiol (JUNEL FE 1/20) 1-20 MG-MCG tablet Take 1 tablet by mouth daily. 84 tablet 4    tacrolimus (PROTOPIC) 0.1 % external ointment Apply topically 2 times daily. 60 g 1    mupirocin (BACTROBAN) 2 % external ointment Use 2 times a day to affected area until healed (probably 7-10 days). May need to repeat course if not completely healed. Please let us know in 6 weeks if not better 15 g 1    norelgestromin-ethinyl estradiol (ORTHO EVRA) 150-35 MCG/24HR patch Remove old patch and apply new patch onto the skin once a week for 3 weeks (21 days). Do not wear patch week 4 (days 22-28), then repeat. (Patient not taking: Reported on 6/26/2025) 9 patch 3       ALLERGIES:  Allergies   Allergen Reactions    Dust Mites Fatigue, Other (See Comments) and Visual Disturbance    Grass Fatigue and Visual Disturbance    Lamb's Quarter (Weed) Fatigue, Other (See Comments) and Visual Disturbance       This note was completed in part using dictation via the Dragon voice recognition software. Some word and grammatical errors may occur and must be interpreted in the appropriate clinical context. If there are any questions pertaining to this issue, please contact me for further clarification.

## 2025-06-26 NOTE — LETTER
6/26/2025    Celena Rivera PA-C  [address]  [place] [address]    RE: Bria Hamilton       Dear Colleague,     I had the pleasure of seeing Bria Hamilton in the [facility].      SERVICE DATE: June 26, 2025      DIAGNOSES/ASSESSMENT:    1. Sinus tachycardia.  Likely secondary to her Adderall, citalopram.  2. Orthostatic lightheadedness with normal orthostatic vital signs and no tachycardia or hypotension noted.  3. Postural orthostatic tachycardia syndrome unlikely.    PLAN:    Discussed encouraged progressive aerobic activities starting with declined aerobic activity such as swimming rowing and recumbent cycling to avoid symptom exacerbation.  Gradually increase intensity over 3 months to improve cardiovascular fitness and reduce symptoms.  Hydrate well with at least 80 ounces of fluid daily.  Increase dietary sodium intake to roughly 3000 mg/day.  Eat smaller, frequent meals low in refined carbohydrates to minimize postprandial symptoms.  Try compression socks to reduce leg blood pooling.  Practice muscle tensing, leg crossing or sit to stand maneuvers to stabilize blood pressure.  Avoid prolonged standing and rise slowly from sitting/lying positions.  At this stage, I do not recommend vasoconstrictor therapies such as midodrine, or volume expander such as fludrocortisone.  Can consider low-dose beta-blocker if tachycardia dominates.  Transthoracic echocardiogram.  I will review and patient will be updated.  Discharge back to primary care.  If she has ongoing symptoms despite above, establish care with the neurology team with expertise in POTS syndrome.    Silverio Gore MD  Cardiology      REFERRING PROVIDER:  Celena Rivera PA-C  [address] [place] [phone]      HISTORY OF PRESENT ILLNESS:  Bria Hamilton is a 25 year old female, Bria, a 25-year-old female, is being evaluated for sinus tachycardia and orthostatic lightheadedness. She has  "a history of ADHD for which she takes amphetamine and dextroamphetamine. She is also on an oral contraceptive pill. Recent testing, including a renal panel, electrolytes, estimated GFR, TSH, CBC, and liver enzymes, were all normal. Her creatinine level was 0.79, and her estimated GFR was greater than 90. A Zyopatch Holter monitor was reviewed and showed normal sinus rhythm with rates between  BPM and less than 1% occasional ventricular ectopics, which correlated with patient-reported symptoms. Orthostatic vital signs taken today showed no significant changes, with supine BP at 109/72, standing BP at 122/86, supine pulse at 72 BPM, and standing pulse at 100 BPM. She was referred by her PCP to consider the possibility of POTS.        Physical exam: /51 (Patient Position: Standing)   Pulse 98   Ht 1.715 m (5' 7.5\")   Wt 71.4 kg (157 lb 4.8 oz)   LMP 04/19/2025 (Exact Date)   BMI 24.27 kg/m    Cardiac auscultation is normal.    New patient.  60 minutes spent by me on the date of the encounter doing chart review, history and exam, documentation and further activities per the note.    The longitudinal plan of care for the condition(s) below were addressed during this visit. Due to the added complexity in care, I will continue to support Bria in the subsequent management of this condition(s) and with the ongoing continuity of care of this condition(s).      Orders Placed This Encounter   Procedures     EKG 12-lead complete w/read - Clinics (performed today)         CURRENT MEDICATIONS:  Current Outpatient Medications   Medication Sig Dispense Refill     amphetamine-dextroamphetamine (ADDERALL XR) 10 MG 24 hr capsule Take 10 mg by mouth daily       amphetamine-dextroamphetamine (ADDERALL) 5 MG tablet as needed.       azelastine (OPTIVAR) 0.05 % ophthalmic solution Place 1 drop into both eyes 2 times daily as needed (for itchy eyes). 6 mL 11     Cetirizine HCl (ZYRTEC PO) Take by mouth 2 times daily.   "     escitalopram (LEXAPRO) 20 MG tablet Take 20 mg by mouth daily.       hydrocortisone 2.5 % ointment Apply thin layer twice daily until rash resolved. Contact your doctor if needed for more than 7 days in a row. 30 g 1     ketoconazole (NIZORAL) 2 % external cream Apply topically daily 60 g 11     naproxen (NAPROSYN) 500 MG tablet as needed.       norethindrone-ethinyl estradiol (JUNEL FE 1/20) 1-20 MG-MCG tablet Take 1 tablet by mouth daily. 84 tablet 4     tacrolimus (PROTOPIC) 0.1 % external ointment Apply topically 2 times daily. 60 g 1     mupirocin (BACTROBAN) 2 % external ointment Use 2 times a day to affected area until healed (probably 7-10 days). May need to repeat course if not completely healed. Please let us know in 6 weeks if not better 15 g 1     norelgestromin-ethinyl estradiol (ORTHO EVRA) 150-35 MCG/24HR patch Remove old patch and apply new patch onto the skin once a week for 3 weeks (21 days). Do not wear patch week 4 (days 22-28), then repeat. (Patient not taking: Reported on 6/26/2025) 9 patch 3       ALLERGIES:  Allergies   Allergen Reactions     Dust Mites Fatigue, Other (See Comments) and Visual Disturbance     Grass Fatigue and Visual Disturbance     Lamb's Quarter (Weed) Fatigue, Other (See Comments) and Visual Disturbance       This note was completed in part using dictation via the Dragon voice recognition software. Some word and grammatical errors may occur and must be interpreted in the appropriate clinical context. If there are any questions pertaining to this issue, please contact me for further clarification.         Thank you for allowing me to participate in the care of your patient.      Sincerely,     Silverio Gore MD     [facility]  cc:   Celena Rivera PA-C  [address] [place] [phone]

## 2025-07-09 ENCOUNTER — MYC MEDICAL ADVICE (OUTPATIENT)
Dept: DERMATOLOGY | Facility: CLINIC | Age: 25
End: 2025-07-09
Payer: COMMERCIAL

## 2025-07-10 NOTE — TELEPHONE ENCOUNTER
Called patient to schedule per Drs request. Spoke with patient who verbalized understanding.     Earle Fong, EMT  Clinic Support  [facility]     [phone]    Employed by [employer]

## 2025-07-22 ENCOUNTER — HOSPITAL ENCOUNTER (OUTPATIENT)
Dept: CARDIOLOGY | Facility: CLINIC | Age: 25
Discharge: HOME | End: 2025-07-22
Attending: INTERNAL MEDICINE
Payer: COMMERCIAL

## 2025-07-22 ENCOUNTER — TELEPHONE (OUTPATIENT)
Dept: CARDIOLOGY | Facility: CLINIC | Age: 25
End: 2025-07-22

## 2025-07-22 DIAGNOSIS — R42 DIZZINESS: ICD-10-CM

## 2025-07-22 DIAGNOSIS — R00.0 TACHYCARDIA: ICD-10-CM

## 2025-07-22 LAB — LVEF ECHO: NORMAL

## 2025-07-22 PROCEDURE — 93306 TTE W/DOPPLER COMPLETE: CPT | Mod: 26 | Performed by: INTERNAL MEDICINE

## 2025-07-22 PROCEDURE — 93306 TTE W/DOPPLER COMPLETE: CPT

## 2025-07-22 NOTE — TELEPHONE ENCOUNTER
Results routed to Dr Gore, ordered for evaluation of tachycardia, lightheadedness. Patient has no return orders at this time.     Interpretation Summary  Probably normal left ventricular systolic function. Subtle regional wall  motion abnormalities could be missed on this study but no gross wall motion  abnormality was identified. Contrast would enhance regional wall motion  analysis.  The visual ejection fraction is 55-60%.  The study was technically difficult.

## 2025-07-24 ENCOUNTER — MYC MEDICAL ADVICE (OUTPATIENT)
Dept: CARDIOLOGY | Facility: CLINIC | Age: 25
End: 2025-07-24
Payer: COMMERCIAL

## 2025-07-24 NOTE — TELEPHONE ENCOUNTER
Silverio Gore MD to Me  [facility] Heart Team 2 (Selected Message)   7/24/25  2:00 PM  I have reviewed echocardiogram.  It is normal.  Patient to follow-up with PCP.     JEB      [employer] message sent to patient.

## 2025-07-28 ENCOUNTER — TELEPHONE (OUTPATIENT)
Dept: DERMATOLOGY | Facility: CLINIC | Age: 25
End: 2025-07-28
Payer: COMMERCIAL

## 2025-07-28 NOTE — TELEPHONE ENCOUNTER
[facility] Call Center    Phone Message    May a detailed message be left on voicemail: yes     Reason for Call: Other: Pt returning call about sooner work-in appointment. No CHRIS slots for provider in Jan     Action Taken: Message routed to:  [facility] Derm    Travel Screening: Not Applicable

## 2025-07-31 ENCOUNTER — OFFICE VISIT (OUTPATIENT)
Dept: DERMATOLOGY | Facility: CLINIC | Age: 25
End: 2025-07-31
Payer: COMMERCIAL

## 2025-07-31 DIAGNOSIS — L30.9 DERMATITIS: ICD-10-CM

## 2025-07-31 DIAGNOSIS — L30.8 OTHER SPECIFIED DERMATITIS: Primary | ICD-10-CM

## 2025-07-31 DIAGNOSIS — K13.0 ANGULAR CHEILITIS: ICD-10-CM

## 2025-07-31 RX ORDER — TACROLIMUS 1 MG/G
OINTMENT TOPICAL 2 TIMES DAILY
Qty: 60 G | Refills: 3 | Status: SHIPPED | OUTPATIENT
Start: 2025-07-31

## 2025-07-31 RX ORDER — FEXOFENADINE HCL 180 MG/1
180 TABLET ORAL DAILY
Qty: 90 TABLET | Refills: 1 | Status: SHIPPED | OUTPATIENT
Start: 2025-07-31

## 2025-07-31 ASSESSMENT — PAIN SCALES - GENERAL: PAINLEVEL_OUTOF10: SEVERE PAIN (8)

## 2025-07-31 NOTE — PROGRESS NOTES
[facility] Dermatology Note  Encounter Date: Jul 31, 2025  Office Visit     Dermatology Problem List:  # Photosensitive Facial Rash, favoring solar urticaria  -Workup: MARYANA +1: 160, negative YAMILETH panel, normal TSH, UA, CRP, ESR, BMP 5/7/2025  -Current: Tacrolimus, fexofenadine (up to 4 times daily)  -Previous: Hydrocortisone 2.5% (not effective), tacrolimus 0.1% ointment   # Dermatitis of the lips + angular cheilitis, favoring allergic contact; chronic, slow improvement   Current tx: tacrolimus + gentle skin care  Previous tx: mupirocin, hydrocortisone, keto   # Seborrheic Dermatitis  H&S, keto cream PRN   # Atopic history (hay fever, allergic to grass, KP)     ____________________________________________    Assessment & Plan:     # Sensitive eruption with pink to erythematous poorly demarcated urticarial plaques.  Favor diagnosis includes solar urticaria given clinical appearance, rapid improvement (greater than 70% improvement since development last night at 9 PM), and significant history of atopy.  Could also consider photosensitive allergic dermatitis with patient's history of atopy.  Less likely atopic dermatitis given lack of eczematous appearance and scale as well as rapid onset.  Given family history, patient does report some concerns about tumid lupus.  Tumid lupus is lower on my differential given clinical appearance of urticarial poorly defined plaques rather than well-demarcated annular pink to violaceous plaques however we did discuss that this is a diagnosis made on biopsy.  We reviewed overall high clinical suspicions for solar urticaria versus photosensitive allergic dermatitis.  Given this, partial treatment with near resolution of rash today, as well as patient does have upcoming wedding, we deferred biopsy today.  We discussed photosensitive patch testing with Dr. Jin ghosh which is pending for 11/2025.  We also discussed treatments including tacrolimus (reviewed risk of  acne and skin thinning of triamcinolone to the face) and oral antihistamines.  Patient is currently on Zyrtec twice daily, but we discussed transitioning to fexofenadine's up to 4 times daily.  -Start fexofenadine twice daily, increasing to 4 times daily as needed  -Start tacrolimus 0.01% ointment twice daily to affected areas for flares  -Pending photosensitive patch testing with Dr. Abdi   -Patient to contact clinic for flares to consider biopsy.  We discussed in detail that patient should not apply topicals prior to the biopsy and that we would recommend biopsy of legs or trunk to avoid scarring to the face should she develop a flare in these areas.    Procedures Performed:   None    Follow-up: As scheduled for Dr. Ratliff in January.    Staff and Resident: [unfilled] Patient seen and staffed with Dr. Isai Grimm MD  Dermatology Resident PGY-3  I, Dee Alex MD, saw this patient with the resident and agree with the resident s findings and plan of care as documented in the resident s note.    ____________________________________________    CC: Derm Problem (Rash on face improving per pt, started the new rx last night and feels it's helping)    HPI:  Ms. Bria Hamilton is a(n) 25 year old female who presents today as a return patient for light reaction.  Patient was last seen on 7/17/2025, by Dr. Ratliff for photosensitive rash.  It had initially been present for a few months and would come and go following sun exposure.  She was given triamcinolone during this appointment given lack of improvement with topical hydrocortisone.    Patient presents today due to flare of the rash.  Patient reports that yesterday she had her engagement photos during which time she tried some new make-up, but had sunscreen on and was outside for approximately 15 minutes.  Approximately 2 to 3 hours after the sun exposure, patient reports that she had flaring of a red, burning, itchy rash to the  sides of her cheeks and her eyebrows.  She reports using triamcinolone 0.01% ointment last night, which has resulted in significant improvement in the rash today.  She reports that her rashes typically do look like this and occasionally occur on her thighs and feet as well.  When they do occur on her feet you can almost see the outline of her flip-flops that she would be wearing.  Patient denies any systemic symptoms of fevers when these events occur and denies any other concerning oral ulcers.  However, she does feel tired when these rashes occur.    Patient does report concern regarding tumid lupus as her mom does have this disease.  She has had previous autoimmune workup which was overall unremarkably below with mild positivity of her MARYANA at 1-1 60, but negative YAMILETH panel.     Patient is otherwise feeling well, without additional skin concerns.    Labs Reviewed:  N/A    Physical Exam:  Vitals: There were no vitals taken for this visit.  SKIN: Focused examination of face and leg was performed.  - Erythematous poorly circumscribed urticarial plaques to the lateral aspects of the face and eyebrows seen on photos  - On exam today, there are faint pink patches to the lateral face and eyebrows  -On the distal thighs there are faint hyperpigmented patches  - No other lesions of concern on areas examined.     Medications:  Current Outpatient Medications   Medication Sig Dispense Refill    amphetamine-dextroamphetamine (ADDERALL XR) 10 MG 24 hr capsule Take 10 mg by mouth daily      amphetamine-dextroamphetamine (ADDERALL) 5 MG tablet as needed.      azelastine (OPTIVAR) 0.05 % ophthalmic solution Place 1 drop into both eyes 2 times daily as needed (for itchy eyes). 6 mL 11    Cetirizine HCl (ZYRTEC PO) Take by mouth 2 times daily.      escitalopram (LEXAPRO) 20 MG tablet Take 20 mg by mouth daily.      ketoconazole (NIZORAL) 2 % external cream Apply topically daily 60 g 11    naproxen (NAPROSYN) 500 MG tablet as needed.       norethindrone-ethinyl estradiol (JUNEL FE 1/20) 1-20 MG-MCG tablet Take 1 tablet by mouth daily. 84 tablet 4    tacrolimus (PROTOPIC) 0.1 % external ointment Apply topically 2 times daily. 60 g 1    triamcinolone (KENALOG) 0.1 % external ointment Apply a thin layer to affected areas of face and legs two times daily for 5 days as needed for facial rash. 30 g 1    hydrocortisone 2.5 % ointment Apply thin layer twice daily until rash resolved. Contact your doctor if needed for more than 7 days in a row. (Patient not taking: Reported on 7/31/2025) 30 g 1    mupirocin (BACTROBAN) 2 % external ointment Use 2 times a day to affected area until healed (probably 7-10 days). May need to repeat course if not completely healed. Please let us know in 6 weeks if not better 15 g 1    norelgestromin-ethinyl estradiol (ORTHO EVRA) 150-35 MCG/24HR patch Remove old patch and apply new patch onto the skin once a week for 3 weeks (21 days). Do not wear patch week 4 (days 22-28), then repeat. (Patient not taking: Reported on 7/31/2025) 9 patch 3     No current facility-administered medications for this visit.      Past Medical History:   Patient Active Problem List   Diagnosis    Prognathism    Angular cheilitis    TERESITA (generalized anxiety disorder)    Congenital anteversion of both femurs    Multiple joint pain    Seborrheic dermatitis    TMJ tenderness, right    TMJ (temporomandibular joint syndrome)    Hypermobility syndrome    Mixed anxiety and depressive disorder    Generalized anxiety disorder    Other specified depressive episodes     Past Medical History:   Diagnosis Date    ADHD (attention deficit hyperactivity disorder)     Angular cheilitis     Follows with dermatology    Congenital anteversion of both femurs     Depressive disorder 2017    TERESITA (generalized anxiety disorder)     Prognathism        CC No referring provider defined for this encounter. on close of this encounter.

## 2025-07-31 NOTE — LETTER
7/31/2025       RE: Bria Hamilton  [address] [place] [id number]     Dear Colleague,    Thank you for referring your patient, Bria Hamilton, to the [facility] [place] at [facility]. Please see a copy of my visit note below.    [facility] Dermatology Note  Encounter Date: Jul 31, 2025  Office Visit     Dermatology Problem List:  # Photosensitive Facial Rash, favoring solar urticaria  -Workup: MARYANA +1: 160, negative YAMILETH panel, normal TSH, UA, CRP, ESR, BMP 5/7/2025  -Current: Tacrolimus, fexofenadine (up to 4 times daily)  -Previous: Hydrocortisone 2.5% (not effective), tacrolimus 0.1% ointment   # Dermatitis of the lips + angular cheilitis, favoring allergic contact; chronic, slow improvement   Current tx: tacrolimus + gentle skin care  Previous tx: mupirocin, hydrocortisone, keto   # Seborrheic Dermatitis  H&S, keto cream PRN   # Atopic history (hay fever, allergic to grass, KP)     ____________________________________________    Assessment & Plan:     # Sensitive eruption with pink to erythematous poorly demarcated urticarial plaques.  Favor diagnosis includes solar urticaria given clinical appearance, rapid improvement (greater than 70% improvement since development last night at 9 PM), and significant history of atopy.  Could also consider photosensitive allergic dermatitis with patient's history of atopy.  Less likely atopic dermatitis given lack of eczematous appearance and scale as well as rapid onset.  Given family history, patient does report some concerns about tumid lupus.  Tumid lupus is lower on my differential given clinical appearance of urticarial poorly defined plaques rather than well-demarcated annular pink to violaceous plaques however we did discuss that this is a diagnosis made on biopsy.  We reviewed overall high clinical suspicions for solar urticaria versus photosensitive  allergic dermatitis.  Given this, partial treatment with near resolution of rash today, as well as patient does have upcoming wedding, we deferred biopsy today.  We discussed photosensitive patch testing with Dr. Abdi already which is pending for 11/2025.  We also discussed treatments including tacrolimus (reviewed risk of acne and skin thinning of triamcinolone to the face) and oral antihistamines.  Patient is currently on Zyrtec twice daily, but we discussed transitioning to fexofenadine's up to 4 times daily.  -Start fexofenadine twice daily, increasing to 4 times daily as needed  -Start tacrolimus 0.01% ointment twice daily to affected areas for flares  -Pending photosensitive patch testing with Dr. Abdi   -Patient to contact clinic for flares to consider biopsy.  We discussed in detail that patient should not apply topicals prior to the biopsy and that we would recommend biopsy of legs or trunk to avoid scarring to the face should she develop a flare in these areas.    Procedures Performed:   None    Follow-up: As scheduled for Dr. Ratliff in January.    Staff and Resident: [unfilled] Patient seen and staffed with Dr. Isai Grimm MD  Dermatology Resident PGY-3  I, Dee Alex MD, saw this patient with the resident and agree with the resident s findings and plan of care as documented in the resident s note.    ____________________________________________    CC: Derm Problem (Rash on face improving per pt, started the new rx last night and feels it's helping)    HPI:  Ms. Bria Hamilton is a(n) 25 year old female who presents today as a return patient for light reaction.  Patient was last seen on 7/17/2025, by Dr. Ratliff for photosensitive rash.  It had initially been present for a few months and would come and go following sun exposure.  She was given triamcinolone during this appointment given lack of improvement with topical hydrocortisone.    Patient presents  today due to flare of the rash.  Patient reports that yesterday she had her engagement photos during which time she tried some new make-up, but had sunscreen on and was outside for approximately 15 minutes.  Approximately 2 to 3 hours after the sun exposure, patient reports that she had flaring of a red, burning, itchy rash to the sides of her cheeks and her eyebrows.  She reports using triamcinolone 0.01% ointment last night, which has resulted in significant improvement in the rash today.  She reports that her rashes typically do look like this and occasionally occur on her thighs and feet as well.  When they do occur on her feet you can almost see the outline of her flip-flops that she would be wearing.  Patient denies any systemic symptoms of fevers when these events occur and denies any other concerning oral ulcers.  However, she does feel tired when these rashes occur.    Patient does report concern regarding tumid lupus as her mom does have this disease.  She has had previous autoimmune workup which was overall unremarkably below with mild positivity of her MARYANA at 1-1 60, but negative YAMILETH panel.     Patient is otherwise feeling well, without additional skin concerns.    Labs Reviewed:  N/A    Physical Exam:  Vitals: There were no vitals taken for this visit.  SKIN: Focused examination of face and leg was performed.  - Erythematous poorly circumscribed urticarial plaques to the lateral aspects of the face and eyebrows seen on photos  - On exam today, there are faint pink patches to the lateral face and eyebrows  -On the distal thighs there are faint hyperpigmented patches  - No other lesions of concern on areas examined.     Medications:  Current Outpatient Medications   Medication Sig Dispense Refill     amphetamine-dextroamphetamine (ADDERALL XR) 10 MG 24 hr capsule Take 10 mg by mouth daily       amphetamine-dextroamphetamine (ADDERALL) 5 MG tablet as needed.       azelastine (OPTIVAR) 0.05 % ophthalmic  solution Place 1 drop into both eyes 2 times daily as needed (for itchy eyes). 6 mL 11     Cetirizine HCl (ZYRTEC PO) Take by mouth 2 times daily.       escitalopram (LEXAPRO) 20 MG tablet Take 20 mg by mouth daily.       ketoconazole (NIZORAL) 2 % external cream Apply topically daily 60 g 11     naproxen (NAPROSYN) 500 MG tablet as needed.       norethindrone-ethinyl estradiol (JUNEL FE 1/20) 1-20 MG-MCG tablet Take 1 tablet by mouth daily. 84 tablet 4     tacrolimus (PROTOPIC) 0.1 % external ointment Apply topically 2 times daily. 60 g 1     triamcinolone (KENALOG) 0.1 % external ointment Apply a thin layer to affected areas of face and legs two times daily for 5 days as needed for facial rash. 30 g 1     hydrocortisone 2.5 % ointment Apply thin layer twice daily until rash resolved. Contact your doctor if needed for more than 7 days in a row. (Patient not taking: Reported on 7/31/2025) 30 g 1     mupirocin (BACTROBAN) 2 % external ointment Use 2 times a day to affected area until healed (probably 7-10 days). May need to repeat course if not completely healed. Please let us know in 6 weeks if not better 15 g 1     norelgestromin-ethinyl estradiol (ORTHO EVRA) 150-35 MCG/24HR patch Remove old patch and apply new patch onto the skin once a week for 3 weeks (21 days). Do not wear patch week 4 (days 22-28), then repeat. (Patient not taking: Reported on 7/31/2025) 9 patch 3     No current facility-administered medications for this visit.      Past Medical History:   Patient Active Problem List   Diagnosis     Prognathism     Angular cheilitis     TERESITA (generalized anxiety disorder)     Congenital anteversion of both femurs     Multiple joint pain     Seborrheic dermatitis     TMJ tenderness, right     TMJ (temporomandibular joint syndrome)     Hypermobility syndrome     Mixed anxiety and depressive disorder     Generalized anxiety disorder     Other specified depressive episodes     Past Medical History:   Diagnosis  Date     ADHD (attention deficit hyperactivity disorder)      Angular cheilitis     Follows with dermatology     Congenital anteversion of both femurs      Depressive disorder 2017     TERESITA (generalized anxiety disorder)      Prognathism        CC No referring provider defined for this encounter. on close of this encounter.      Again, thank you for allowing me to participate in the care of your patient.      Sincerely,    July Grimm MD

## 2025-07-31 NOTE — NURSING NOTE
Dermatology Rooming Note    Bria Hamilton's goals for this visit include:   Chief Complaint   Patient presents with    Derm Problem     Rash on face improving per pt, started the new rx last night and feels it's helping       NESTOR Chiu

## 2025-08-04 ENCOUNTER — PATIENT OUTREACH (OUTPATIENT)
Dept: CARE COORDINATION | Facility: CLINIC | Age: 25
End: 2025-08-04

## 2025-11-05 ENCOUNTER — PRE VISIT (OUTPATIENT)
Dept: ALLERGY | Facility: CLINIC | Age: 25
End: 2025-11-05